# Patient Record
Sex: FEMALE | Race: BLACK OR AFRICAN AMERICAN | Employment: OTHER | URBAN - METROPOLITAN AREA
[De-identification: names, ages, dates, MRNs, and addresses within clinical notes are randomized per-mention and may not be internally consistent; named-entity substitution may affect disease eponyms.]

---

## 2017-01-23 DIAGNOSIS — I10 ESSENTIAL HYPERTENSION WITH GOAL BLOOD PRESSURE LESS THAN 140/90: ICD-10-CM

## 2017-01-29 RX ORDER — AMLODIPINE BESYLATE 5 MG/1
5 TABLET ORAL DAILY
Qty: 90 TAB | Refills: 0 | Status: SHIPPED | OUTPATIENT
Start: 2017-01-29 | End: 2017-05-30 | Stop reason: SDUPTHER

## 2017-05-26 DIAGNOSIS — N95.1 SYMPTOMS, SUCH AS FLUSHING, SLEEPLESSNESS, HEADACHE, LACK OF CONCENTRATION, ASSOCIATED WITH THE MENOPAUSE: ICD-10-CM

## 2017-05-26 DIAGNOSIS — I10 ESSENTIAL HYPERTENSION WITH GOAL BLOOD PRESSURE LESS THAN 140/90: ICD-10-CM

## 2017-05-30 RX ORDER — ATENOLOL 50 MG/1
TABLET ORAL
Qty: 90 TAB | Refills: 0 | Status: SHIPPED | OUTPATIENT
Start: 2017-05-30 | End: 2019-03-27 | Stop reason: SDUPTHER

## 2017-05-30 RX ORDER — AMLODIPINE BESYLATE 5 MG/1
5 TABLET ORAL DAILY
Qty: 90 TAB | Refills: 0 | Status: SHIPPED | OUTPATIENT
Start: 2017-05-30 | End: 2019-03-27 | Stop reason: SDUPTHER

## 2017-05-30 RX ORDER — CLONIDINE HYDROCHLORIDE 0.1 MG/1
0.1 TABLET ORAL
Qty: 90 TAB | Refills: 0 | Status: SHIPPED | OUTPATIENT
Start: 2017-05-30 | End: 2019-03-27 | Stop reason: SDUPTHER

## 2017-09-23 DIAGNOSIS — N95.1 SYMPTOMS, SUCH AS FLUSHING, SLEEPLESSNESS, HEADACHE, LACK OF CONCENTRATION, ASSOCIATED WITH THE MENOPAUSE: ICD-10-CM

## 2017-09-28 RX ORDER — CLONIDINE HYDROCHLORIDE 0.1 MG/1
TABLET ORAL
Qty: 90 TAB | Refills: 0 | OUTPATIENT
Start: 2017-09-28

## 2017-09-28 NOTE — TELEPHONE ENCOUNTER
Patient notified that her f/s had  over a year ago and she needed to renew before getting a provider appt. Assisted pt in making a f/s appt, discussed with her what needed to be brought in for that and on that day Oct 11, pt can be given a provider appointment for future visit to discuss refills needed.

## 2017-10-30 ENCOUNTER — HOSPITAL ENCOUNTER (OUTPATIENT)
Dept: MAMMOGRAPHY | Age: 57
Discharge: HOME | End: 2017-10-30
Attending: FAMILY MEDICINE
Payer: COMMERCIAL

## 2017-10-30 DIAGNOSIS — Z12.31 VISIT FOR SCREENING MAMMOGRAM: ICD-10-CM

## 2017-10-30 PROCEDURE — 77067 SCR MAMMO BI INCL CAD: CPT

## 2018-01-11 DIAGNOSIS — I10 ESSENTIAL HYPERTENSION WITH GOAL BLOOD PRESSURE LESS THAN 140/90: ICD-10-CM

## 2018-01-22 RX ORDER — ATENOLOL 50 MG/1
TABLET ORAL
Qty: 90 TAB | Refills: 0 | OUTPATIENT
Start: 2018-01-22

## 2018-01-29 ENCOUNTER — HOSPITAL ENCOUNTER (EMERGENCY)
Age: 58
Discharge: HOME | End: 2018-01-29
Attending: FAMILY MEDICINE

## 2018-01-29 ENCOUNTER — APPOINTMENT (OUTPATIENT)
Dept: GENERAL RADIOLOGY | Age: 58
End: 2018-01-29
Attending: FAMILY MEDICINE

## 2018-01-29 VITALS
TEMPERATURE: 97.8 F | BODY MASS INDEX: 34.36 KG/M2 | DIASTOLIC BLOOD PRESSURE: 79 MMHG | SYSTOLIC BLOOD PRESSURE: 176 MMHG | HEART RATE: 68 BPM | HEIGHT: 62 IN | OXYGEN SATURATION: 98 % | WEIGHT: 186.7 LBS | RESPIRATION RATE: 18 BRPM

## 2018-01-29 DIAGNOSIS — G57.01 PYRIFORMIS SYNDROME, RIGHT: Primary | ICD-10-CM

## 2018-01-29 LAB
BILIRUB UR QL: NEGATIVE
GLUCOSE UR QL STRIP.AUTO: NEGATIVE MG/DL
KETONES UR-MCNC: NEGATIVE MG/DL
LEUKOCYTE ESTERASE UR QL STRIP: NEGATIVE
NITRITE UR QL: NEGATIVE
PH UR: 6 [PH] (ref 5–8)
PROT UR QL: NEGATIVE MG/DL
RBC # UR STRIP: ABNORMAL /UL
SP GR UR: 1.02 (ref 1–1.03)
UROBILINOGEN UR QL: 0.2 EU/DL (ref 0.2–1)

## 2018-01-29 RX ORDER — METHOCARBAMOL 750 MG/1
750 TABLET, FILM COATED ORAL
Qty: 20 TAB | Refills: 0 | Status: SHIPPED | OUTPATIENT
Start: 2018-01-29 | End: 2019-01-19

## 2018-01-29 RX ORDER — PREDNISONE 20 MG/1
TABLET ORAL
Qty: 21 TAB | Refills: 0 | Status: SHIPPED | OUTPATIENT
Start: 2018-01-29 | End: 2019-01-19

## 2018-01-29 NOTE — DISCHARGE INSTRUCTIONS
Piriformis Syndrome: Care Instructions  Your Care Instructions    The piriformis muscle is deep under your rear end (buttock). One end of the muscle connects deep inside the pelvic area, and the other end attaches to the top of the thighbone. This muscle can press on the sciatic nerve that runs from your spine down your leg. When this happens, you may have pain, numbness, and tingling in the buttock and down the back of your leg. This is called piriformis syndrome. The pain may get worse when you sit for a long time or climb stairs. Also, you may be more likely to develop piriformis syndrome if you run or walk often. Your doctor will check for other causes of your pain before treating this syndrome. Treatment may include stretching exercises, massage, and medicine for the pain and swelling. If these do not help, you may get a shot of steroid medicine. Until the pain is gone, you may need to rest the muscle and limit activities like running. Exercises and a change in how you move and sit may be enough to stop the pressure on the nerve. Follow-up care is a key part of your treatment and safety. Be sure to make and go to all appointments, and call your doctor if you are having problems. It's also a good idea to know your test results and keep a list of the medicines you take. How can you care for yourself at home? · If your doctor thinks that strenuous exercise is causing your problem, stop or cut back on activities such as running. You may find swimming to be a good exercise for a while. · Stretch the piriformis muscle. Marlise Thompson on your back. ¨ Bend one leg at the knee and keep the other leg flat on the ground. ¨ Raise your bent knee up and then move it across your body. Hold the outside of the knee with the opposite hand. ¨ Gently pull the knee with your hand toward the opposite shoulder. ¨ Hold the stretch for at least 15 to 30 seconds. Switch legs. ¨ Do the stretch several times each day.   · Massage the muscle to relieve pressure. ¨ Sit on the floor. Lean to one side so that the hip on your sore side is off the ground. Put a tennis ball under your buttock on that side. ¨ As you put weight onto the tennis ball, you may find spots that are especially sore. Move gently so that the tennis ball gently massages each of the sore spots. · Use ice or heat to help reduce pain. Put ice or a cold pack or a heating pad set on low or a warm cloth on the sore area for 10 to 20 minutes at a time. Put a thin cloth between the ice pack or heating pad and your skin. · Ask your doctor if you can take an over-the-counter pain medicine, such as acetaminophen (Tylenol), ibuprofen (Advil, Motrin), or naproxen (Aleve). Be safe with medicines. Read and follow all instructions on the label. · Have your doctor or a physical therapist watch how you move. You may need physical therapy or special inserts in your shoes (orthotics) to help you move in a way that does not put pressure on your nerves. When should you call for help? Watch closely for changes in your health, and be sure to contact your doctor if:  ? · You do not feel better after several weeks of home care. ? · Your pain gets worse. ? · Your leg becomes weak or numb. Where can you learn more? Go to http://wilmar-nilesh.info/. Enter F252 in the search box to learn more about \"Piriformis Syndrome: Care Instructions. \"  Current as of: March 21, 2017  Content Version: 11.4  © 6996-3056 [employer]. Care instructions adapted under license by [employer] (which disclaims liability or warranty for this information). If you have questions about a medical condition or this instruction, always ask your healthcare professional. [place] [address] any warranty or liability for your use of this information.          Piriformis Syndrome: Exercises  Your Care Instructions  Here are some examples of typical rehabilitation exercises for your condition. Start each exercise slowly. Ease off the exercise if you start to have pain. Your doctor or physical therapist will tell you when you can start these exercises and which ones will work best for you. How to do the exercises  Hip rotator stretch    1. Lie on your back with both knees bent and your feet flat on the floor. 2. Put the ankle of your affected leg on your opposite thigh near your knee. 3. Use your hand to gently push your knee (on your affected leg) away from your body until you feel a gentle stretch around your hip. 4. Hold the stretch for 15 to 30 seconds. 5. Repeat 2 to 4 times. 6. Switch legs and repeat steps 1 through 5. Piriformis stretch    1. Lie on your back with your legs straight. 2. Lift your affected leg and bend your knee. With your opposite hand, reach across your body, and then gently pull your knee toward your opposite shoulder. 3. Hold the stretch for 15 to 30 seconds. 4. Repeat with your other leg. 5. Repeat 2 to 4 times on each side. Lower abdominal strengthening    1. Lie on your back with your knees bent and your feet flat on the floor. 2. Tighten your belly muscles by pulling your belly button in toward your spine. 3. Lift one foot off the floor and bring your knee toward your chest, so that your knee is straight above your hip and your leg is bent like the letter \"L. \"  4. Lift the other knee up to the same position. 5. Lower one leg at a time to the starting position. 6. Keep alternating legs until you have lifted each leg 8 to 12 times. 7. Be sure to keep your belly muscles tight and your back still as you are moving your legs. Be sure to breathe normally. Follow-up care is a key part of your treatment and safety. Be sure to make and go to all appointments, and call your doctor if you are having problems. It's also a good idea to know your test results and keep a list of the medicines you take. Where can you learn more?   Go to http://wilmar-nilesh.info/. Enter D398 in the search box to learn more about \"Piriformis Syndrome: Exercises. \"  Current as of: March 21, 2017  Content Version: 11.4  © 7218-8204 [employer], [employer]. Care instructions adapted under license by [employer] (which disclaims liability or warranty for this information). If you have questions about a medical condition or this instruction, always ask your healthcare professional. [place] [address] any warranty or liability for your use of this information.

## 2018-01-29 NOTE — UC PROVIDER NOTE
Patient is a 65 y.o. female presenting with buttocks pain. The history is provided by the patient. Buttocks pain    This is a new problem. The current episode started more than 1 week ago (3 weeks). The problem occurs constantly. The problem has not changed since onset. The pain is present in the right hip and right upper leg (rt buttock. ). The quality of the pain is described as aching and constant. The pain is at a severity of 7/10. The pain is moderate. Associated symptoms include limited range of motion. Pertinent negatives include no stiffness. The symptoms are aggravated by standing, activity and movement. She has tried arthritis medications for the symptoms. The treatment provided no relief. There has been no history of extremity trauma. Past Medical History:   Diagnosis Date    Asthma     Diverticulosis     on colonoscopy 2012    Essential hypertension         Past Surgical History:   Procedure Laterality Date    HX APPENDECTOMY      HX HYSTERECTOMY  1997    secondary to endometriosis    UT COLONOSCOPY FLX DX W/COLLJ SPEC WHEN PFRMD  12/12/2012              Family History   Problem Relation Age of Onset    Diabetes Mother     Cancer Mother 66     breast cancer    Breast Cancer Mother 68    Cancer Maternal Aunt      2 with breast cancer    Stroke Maternal Grandmother         Social History     Social History    Marital status:      Spouse name: N/A    Number of children: N/A    Years of education: N/A     Occupational History    Not on file. Social History Main Topics    Smoking status: Former Smoker     Types: Cigarettes     Quit date: 7/3/1997    Smokeless tobacco: Never Used      Comment: Quit in 1990s.  Alcohol use No    Drug use: No    Sexual activity: Not on file     Other Topics Concern    Not on file     Social History Narrative                ALLERGIES: Other medication    Review of Systems   Musculoskeletal: Negative for stiffness.    All other systems reviewed and are negative. Vitals:    01/29/18 1512   BP: 176/79   Pulse: 68   Resp: 18   Temp: 97.8 °F (36.6 °C)   SpO2: 98%   Weight: 84.7 kg (186 lb 11.2 oz)   Height: 5' 2\" (1.575 m)       Physical Exam   Constitutional: No distress. Musculoskeletal: She exhibits no edema. Right hip: She exhibits normal range of motion, normal strength, no tenderness, no bony tenderness and no crepitus. Cervical back: Normal.        Thoracic back: Normal.        Lumbar back: Normal.        Legs:  Nursing note and vitals reviewed. MDM     Differential Diagnosis; Clinical Impression; Plan:     CLINICAL IMPRESSION:  Pyriformis syndrome, right  (primary encounter diagnosis)      DDX    Plan:    Mild OA of bilateral hip  Self exercise- and avoid strenuous activity     Consider to see chiropractor if not better in 3 days    Tapering prednisone and robaxin     Follow with PCP   Amount and/or Complexity of Data Reviewed:   Tests in the radiology section of CPT®:  Ordered and reviewed   Review and summarize past medical records:  Yes  Risk of Significant Complications, Morbidity, and/or Mortality:   Presenting problems: Moderate  Diagnostic procedures: Moderate  Management options:   Moderate  Progress:   Patient progress:  Stable      Procedures

## 2018-10-31 ENCOUNTER — HOSPITAL ENCOUNTER (OUTPATIENT)
Dept: MAMMOGRAPHY | Age: 58
Discharge: HOME | End: 2018-10-31
Attending: FAMILY MEDICINE
Payer: COMMERCIAL

## 2018-10-31 DIAGNOSIS — Z12.39 BREAST SCREENING: ICD-10-CM

## 2018-10-31 PROCEDURE — 77067 SCR MAMMO BI INCL CAD: CPT

## 2019-01-19 ENCOUNTER — OFFICE VISIT (OUTPATIENT)
Dept: URGENT CARE | Age: 59
End: 2019-01-19

## 2019-01-19 VITALS
HEIGHT: 62 IN | SYSTOLIC BLOOD PRESSURE: 174 MMHG | HEART RATE: 68 BPM | TEMPERATURE: 98.7 F | BODY MASS INDEX: 36.44 KG/M2 | OXYGEN SATURATION: 97 % | RESPIRATION RATE: 18 BRPM | WEIGHT: 198 LBS | DIASTOLIC BLOOD PRESSURE: 90 MMHG
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
## 2019-01-19 DIAGNOSIS — M25.551 RIGHT HIP PAIN: Primary | ICD-10-CM

## 2019-01-19 RX ORDER — PREDNISONE 5 MG/1
TABLET ORAL
Qty: 21 TAB | Refills: 0 | Status: SHIPPED | OUTPATIENT
Start: 2019-01-19 | End: 2019-03-27

## 2019-01-19 NOTE — PROGRESS NOTES
Slipped on ice Monday (5 days ago) while walking  Hit right hip on the ground. Did not hit head and denies any other extremity or body part injury. She has continued to have 8 out of 10 right hip pain sharp lateral to posterior in location, non radiating Intermittent throughout the day. Worse with walking. Improved with rest.  Has tried OTC motrin without improvement. Overall not getting better  Specifically denies: clunking, popping, or leg giving out. Hx of piriformis syndrome right side             Past Medical History:   Diagnosis Date    Asthma     Diverticulosis     on colonoscopy     Essential hypertension         Past Surgical History:   Procedure Laterality Date    HX APPENDECTOMY      HX HYSTERECTOMY      secondary to endometriosis    [facility] COLONOSCOPY FLX DX W/COLLJ SPEC WHEN PFRMD  2012              Family History   Problem Relation Age of Onset    Diabetes Mother     Cancer Mother 66        breast cancer    Breast Cancer Mother 68    Cancer Maternal Aunt         2 with breast cancer    Breast Cancer Maternal Aunt     Stroke Maternal Grandmother     Breast Cancer Maternal Aunt         Social History     Socioeconomic History    Marital status:      Spouse name: Not on file    Number of children: Not on file    Years of education: Not on file    Highest education level: Not on file   Social Needs    Financial resource strain: Not on file    Food insecurity - worry: Not on file    Food insecurity - inability: Not on file   Propel needs - medical: Not on file   Propel needs - non-medical: Not on file   Occupational History    Not on file   Tobacco Use    Smoking status: Former Smoker     Types: Cigarettes     Last attempt to quit: 7/3/1997     Years since quittin.5    Smokeless tobacco: Never Used    Tobacco comment: Quit in 36s.    Substance and Sexual Activity    Alcohol use: No     Alcohol/week: 0.0 oz    Drug use: No    Sexual activity: Not on file   Other Topics Concern    Not on file   Social History Narrative    Not on file                ALLERGIES: Other medication    Review of Systems   Neurological: Negative for dizziness, weakness and numbness. All other systems reviewed and are negative. Vitals:    01/19/19 1040   BP: 174/90   Pulse: 68   Resp: 18   Temp: 98.7 °F (37.1 °C)   SpO2: 97%   Weight: 198 lb (89.8 kg)   Height: 5' 2\" (1.575 m)       Physical Exam   Constitutional: She is oriented to person, place, and time. No distress. HENT:   Head: Normocephalic and atraumatic. Eyes: EOM are normal. Pupils are equal, round, and reactive to light. Neck: Normal range of motion. Neck supple. Cardiovascular: Normal rate, regular rhythm and normal heart sounds. Pulmonary/Chest: Effort normal and breath sounds normal. No respiratory distress. She has no wheezes. She has no rales. Musculoskeletal:        Right hip: She exhibits tenderness. She exhibits normal range of motion, normal strength, no swelling, no crepitus and no deformity. Legs:  Knee flexion and extension 5/5 strength. No hamstring pain. Pain elicited with right hip flexion and external rotation. Neurological: She is alert and oriented to person, place, and time. Skin: No rash noted. She is not diaphoretic. No pallor. Psychiatric: She has a normal mood and affect. Her behavior is normal. Thought content normal.       MDM     Differential Diagnosis; Clinical Impression; Plan:       CLINICAL IMPRESSION:  (M25.551) Right hip pain  (primary encounter diagnosis)  (E32.96MA) Fall from ground level    Orders Placed This Encounter      predniSONE (STERAPRED) 5 mg dose pack          Sig: See administration instruction per 5mg dose pack          Dispense:  21 Tab          Refill:  0      Plan:  1. No acute abnormality on x ray  2. Prednisone for pain, avoid NSAIDs take with food. 3. Cool or warm compresses  4.  Follow up with Sports Med OR Orthopedics without improvement over next 7 to 10 days. We have reviewed concerning signs/symptoms, normal vs abnormal progression of medical condition and when to seek immediate medical attention. XR Results (most recent):  Results from Appointment encounter on 01/19/19   XR HIP RT W OR WO PELV 2-3 VWS    Narrative EXAM: XR HIP RT W OR WO PELV 2-3 VWS    INDICATION: fall, hit R hip on ground, right hip pain. FINDINGS: An AP view of the pelvis and a frogleg lateral view of the right hip  demonstrate no fracture, dislocation or other acute abnormality. Impression IMPRESSION: No acute abnormality.          Procedures

## 2019-01-19 NOTE — PATIENT INSTRUCTIONS
Follow up with ortho OR PCP without improvement over next 7-10 days sooner/immediately for new or worsening       Hip Pain: Care Instructions  Your Care Instructions    Hip pain may be caused by many things, including overuse, a fall, or a twisting movement. Another cause of hip pain is arthritis. Your pain may increase when you stand up, walk, or squat. The pain may come and go or may be constant. Home treatment can help relieve hip pain, swelling, and stiffness. If your pain is ongoing, you may need more tests and treatment. Follow-up care is a key part of your treatment and safety. Be sure to make and go to all appointments, and call your doctor if you are having problems. It's also a good idea to know your test results and keep a list of the medicines you take. How can you care for yourself at home? · Take pain medicines exactly as directed. ? If the doctor gave you a prescription medicine for pain, take it as prescribed. ? If you are not taking a prescription pain medicine, ask your doctor if you can take an over-the-counter medicine. · Rest and protect your hip. Take a break from any activity, including standing or walking, that may cause pain. · Put ice or a cold pack against your hip for 10 to 20 minutes at a time. Try to do this every 1 to 2 hours for the next 3 days (when you are awake) or until the swelling goes down. Put a thin cloth between the ice and your skin. · Sleep on your healthy side with a pillow between your knees, or sleep on your back with pillows under your knees. · If there is no swelling, you can put moist heat, a heating pad, or a warm cloth on your hip. Do gentle stretching exercises to help keep your hip flexible. · Learn how to prevent falls. Have your vision and hearing checked regularly. Wear slippers or shoes with a nonskid sole. · Stay at a healthy weight. · Wear comfortable shoes. When should you call for help?   Call 911 anytime you think you may need emergency care. For example, call if:    · You have sudden chest pain and shortness of breath, or you cough up blood.     · You are not able to stand or walk or bear weight.     · Your buttocks, legs, or feet feel numb or tingly.     · Your leg or foot is cool or pale or changes color.     · You have severe pain.    Call your doctor now or seek immediate medical care if:    · You have signs of infection, such as:  ? Increased pain, swelling, warmth, or redness in the hip area. ? Red streaks leading from the hip area. ? Pus draining from the hip area. ? A fever.     · You have signs of a blood clot, such as:  ? Pain in your calf, back of the knee, thigh, or groin. ? Redness and swelling in your leg or groin.     · You are not able to bend, straighten, or move your leg normally.     · You have trouble urinating or having bowel movements.    Watch closely for changes in your health, and be sure to contact your doctor if:    · You do not get better as expected. Where can you learn more? Go to http://wilmar-nilesh.info/. Enter J396 in the search box to learn more about \"Hip Pain: Care Instructions. \"  Current as of: September 23, 2018  Content Version: 11.9  © 8260-6097 [employer]. Care instructions adapted under license by [employer] (which disclaims liability or warranty for this information). If you have questions about a medical condition or this instruction, always ask your healthcare professional. [place] [address] any warranty or liability for your use of this information.

## 2019-03-27 ENCOUNTER — OFFICE VISIT (OUTPATIENT)
Dept: FAMILY MEDICINE CLINIC | Age: 59
End: 2019-03-27

## 2019-03-27 ENCOUNTER — HOSPITAL ENCOUNTER (OUTPATIENT)
Dept: LAB | Age: 59
Discharge: HOME | End: 2019-03-27

## 2019-03-27 VITALS
SYSTOLIC BLOOD PRESSURE: 143 MMHG | BODY MASS INDEX: 35.48 KG/M2 | DIASTOLIC BLOOD PRESSURE: 79 MMHG | HEART RATE: 65 BPM | WEIGHT: 194 LBS | TEMPERATURE: 98.3 F

## 2019-03-27 DIAGNOSIS — J45.40 MODERATE PERSISTENT ASTHMA WITHOUT COMPLICATION: ICD-10-CM

## 2019-03-27 DIAGNOSIS — N95.1 SYMPTOMS, SUCH AS FLUSHING, SLEEPLESSNESS, HEADACHE, LACK OF CONCENTRATION, ASSOCIATED WITH THE MENOPAUSE: ICD-10-CM

## 2019-03-27 DIAGNOSIS — I10 ESSENTIAL HYPERTENSION WITH GOAL BLOOD PRESSURE LESS THAN 140/90: ICD-10-CM

## 2019-03-27 DIAGNOSIS — I10 ESSENTIAL HYPERTENSION WITH GOAL BLOOD PRESSURE LESS THAN 140/90: Primary | ICD-10-CM

## 2019-03-27 PROCEDURE — 80061 LIPID PANEL: CPT

## 2019-03-27 PROCEDURE — 80053 COMPREHEN METABOLIC PANEL: CPT

## 2019-03-27 RX ORDER — ATENOLOL 50 MG/1
TABLET ORAL
Qty: 90 TAB | Refills: 1 | Status: SHIPPED | OUTPATIENT
Start: 2019-03-27 | End: 2019-08-14 | Stop reason: SDUPTHER

## 2019-03-27 RX ORDER — CLONIDINE HYDROCHLORIDE 0.1 MG/1
0.1 TABLET ORAL
Qty: 90 TAB | Refills: 1 | Status: SHIPPED | OUTPATIENT
Start: 2019-03-27 | End: 2019-08-14 | Stop reason: SDUPTHER

## 2019-03-27 RX ORDER — AMLODIPINE BESYLATE 5 MG/1
5 TABLET ORAL DAILY
Qty: 90 TAB | Refills: 1 | Status: SHIPPED | OUTPATIENT
Start: 2019-03-27 | End: 2019-08-14 | Stop reason: SDUPTHER

## 2019-03-27 RX ORDER — ALBUTEROL SULFATE 90 UG/1
1 AEROSOL, METERED RESPIRATORY (INHALATION)
Qty: 1 INHALER | Refills: 2 | Status: SHIPPED | OUTPATIENT
Start: 2019-03-27 | End: 2019-06-26 | Stop reason: SDUPTHER

## 2019-03-27 NOTE — PROGRESS NOTES
Subjective: Chief Complaint Patient presents with  Medication Refill  Diarrhea  
  x 5 days  
 
she is a 63y.o. year old female who presents for evalution. Refill bp meds, [facility] has been her p[facility], last saw them 1 year ago, has run out of amlodipine about a month ago  but is taking the others. Refill albuterol, h/o mild persistant asthma, former smoker. N/v and diarrhea past 4 days, no n/v for several days, loose stool x 1 yesterday and early this am.  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 Stomach feels pretty good at this time, has been using immodium occas. . Eating a bland mostly liquid diet. Past Medical History:  
Diagnosis Date  Asthma  Diverticulosis   
 on colonoscopy 2012  Essential hypertension Objective:  
 
Vitals:  
 03/27/19 3252 BP: 143/79 Pulse: 65 Temp: 98.3 °F (36.8 °C) TempSrc: Oral  
Weight: 194 lb (88 kg) Physical Examination: General appearance - alert, well appearing, and in no distress Neck - supple, no significant adenopathy, carotids upstroke normal bilaterally, no bruits, thyroid exam: thyroid is normal in size without nodules or tenderness Chest - clear to auscultation, no wheezes, rales or rhonchi, symmetric air entry Heart - normal rate, regular rhythm, normal S1, S2, no murmurs, rubs, clicks or gallops Abdomen - soft, nontender, nondistended, no masses or organomegaly Assessment/ Plan: 1.  htn-- restart amlodipine and continue other htn meds. 2.  GE, resolving. Start solids, avoid fatty foods and lactose until sx are completely resolved. Ok to use immodium occas if needs to get to work or be away from the house. 3.  Mild intermittent asthma, refilled albuterol. Orders Placed This Encounter  cloNIDine HCl (CATAPRES) 0.1 mg tablet Sig: Take 1 Tab by mouth nightly. Need to make office appointment for BP check. Dispense:  90 Tab Refill:  1 This patient needs to make an office appointment before more refills will be given.  atenolol (TENORMIN) 50 mg tablet Sig: TAKE ONE TABLET BY MOUTH ONE TIME DAILY Dispense:  90 Tab Refill:  1 This patient needs to make an office appointment before more refills will be given.  amLODIPine (NORVASC) 5 mg tablet Sig: Take 1 Tab by mouth daily. Dispense:  90 Tab Refill:  1 This patient needs to make an office appointment before more refills will be given.  albuterol (PROVENTIL HFA, VENTOLIN HFA, PROAIR HFA) 90 mcg/actuation inhaler Sig: Take 1 Puff by inhalation every six (6) hours as needed for Wheezing. Dispense:  1 Inhaler Refill:  2

## 2019-03-28 LAB
ALBUMIN SERPL-MCNC: 3.7 G/DL (ref 3.5–5)
ALBUMIN/GLOB SERPL: 1 {RATIO} (ref 1.1–2.2)
ALP SERPL-CCNC: 73 U/L (ref 45–117)
ALT SERPL-CCNC: 34 U/L (ref 12–78)
ANION GAP SERPL CALC-SCNC: 6 MMOL/L (ref 5–15)
AST SERPL-CCNC: 19 U/L (ref 15–37)
BILIRUB SERPL-MCNC: 0.3 MG/DL (ref 0.2–1)
BUN SERPL-MCNC: 11 MG/DL (ref 6–20)
BUN/CREAT SERPL: 20 (ref 12–20)
CALCIUM SERPL-MCNC: 8.9 MG/DL (ref 8.5–10.1)
CHLORIDE SERPL-SCNC: 107 MMOL/L (ref 97–108)
CHOLEST SERPL-MCNC: 115 MG/DL
CO2 SERPL-SCNC: 28 MMOL/L (ref 21–32)
CREAT SERPL-MCNC: 0.55 MG/DL (ref 0.55–1.02)
GLOBULIN SER CALC-MCNC: 3.8 G/DL (ref 2–4)
GLUCOSE SERPL-MCNC: 82 MG/DL (ref 65–100)
HDLC SERPL-MCNC: 37 MG/DL
HDLC SERPL: 3.1 {RATIO} (ref 0–5)
LDLC SERPL CALC-MCNC: 62 MG/DL (ref 0–100)
LIPID PROFILE,FLP: NORMAL
POTASSIUM SERPL-SCNC: 4.1 MMOL/L (ref 3.5–5.1)
PROT SERPL-MCNC: 7.5 G/DL (ref 6.4–8.2)
SODIUM SERPL-SCNC: 141 MMOL/L (ref 136–145)
TRIGL SERPL-MCNC: 80 MG/DL (ref ?–150)
VLDLC SERPL CALC-MCNC: 16 MG/DL

## 2019-04-19 ENCOUNTER — OFFICE VISIT (OUTPATIENT)
Dept: URGENT CARE | Age: 59
End: 2019-04-19

## 2019-04-19 VITALS
SYSTOLIC BLOOD PRESSURE: 138 MMHG | DIASTOLIC BLOOD PRESSURE: 79 MMHG | TEMPERATURE: 98.3 F | RESPIRATION RATE: 16 BRPM | WEIGHT: 194 LBS | BODY MASS INDEX: 35.7 KG/M2 | HEIGHT: 62 IN | OXYGEN SATURATION: 97 % | HEART RATE: 62 BPM

## 2019-04-19 DIAGNOSIS — F43.9 STRESS: ICD-10-CM

## 2019-04-19 DIAGNOSIS — R07.9 CHEST PAIN, UNSPECIFIED TYPE: Primary | ICD-10-CM

## 2019-04-19 NOTE — PROGRESS NOTES
Chest Pain The history is provided by the patient. This is a new problem. The current episode started yesterday. The problem has been resolved. 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 The pain is present in the right side. The pain is mild. Quality: \"pulling\" like a muscle. The pain does not radiate. The symptoms are aggravated by stress. Pertinent negatives include no abdominal pain, no back pain, no claudication, no cough, no diaphoresis, no dizziness, no exertional chest pressure, no fever, no headaches, no hemoptysis, no irregular heartbeat, no leg pain, no lower extremity edema, no malaise/fatigue, no nausea, no near-syncope, no numbness, no orthopnea, no palpitations, no PND, no shortness of breath, no sputum production, no vomiting and no weakness. She has tried nothing for the symptoms. Her past medical history is significant for HTN. Past Medical History:  
Diagnosis Date  Asthma  Diverticulosis   
 on colonoscopy 2012  Essential hypertension Past Surgical History:  
Procedure Laterality Date  HX APPENDECTOMY  HX HYSTERECTOMY  1997  
 secondary to endometriosis  UT COLONOSCOPY FLX DX W/COLLJ SPEC WHEN PFRMD  12/12/2012 Family History Problem Relation Age of Onset  Diabetes Mother  Cancer Mother 66  
     breast cancer  Breast Cancer Mother 68  Cancer Maternal Aunt   
     2 with breast cancer  Breast Cancer Maternal Aunt  Stroke Maternal Grandmother  Breast Cancer Maternal Aunt Social History Socioeconomic History  Marital status:  Spouse name: Not on file  Number of children: Not on file  Years of education: Not on file  Highest education level: Not on file Occupational History  Not on file Social Needs  Financial resource strain: Not on file  Food insecurity:  
  Worry: Not on file Inability: Not on file  Transportation needs: Medical: Not on file Non-medical: Not on file Tobacco Use  Smoking status: Former Smoker Types: Cigarettes Last attempt to quit: 7/3/1997 Years since quittin.8  Smokeless tobacco: Never Used  Tobacco comment: Quit in 37s. Substance and Sexual Activity  Alcohol use: No  
  Alcohol/week: 0.0 oz  Drug use: No  
 Sexual activity: Not on file Lifestyle  Physical activity:  
  Days per week: Not on file Minutes per session: Not on file  Stress: Not on file Relationships  Social connections:  
  Talks on phone: Not on file Gets together: Not on file Attends Mandaen service: Not on file Active member of club or organization: Not on file Attends meetings of clubs or organizations: Not on file Relationship status: Not on file  Intimate partner violence:  
  Fear of current or ex partner: Not on file Emotionally abused: Not on file Physically abused: Not on file Forced sexual activity: Not on file Other Topics Concern  Not on file Social History Narrative  Not on file ALLERGIES: Other medication Review of Systems Constitutional: Negative for chills, diaphoresis, fever and malaise/fatigue. Respiratory: Negative for cough, hemoptysis, sputum production, shortness of breath and wheezing. Cardiovascular: Positive for chest pain. Negative for palpitations, orthopnea, claudication, PND and near-syncope. Gastrointestinal: Negative for abdominal pain, nausea and vomiting. Musculoskeletal: Negative for back pain and myalgias. Skin: Negative for rash. Neurological: Negative for dizziness, weakness, numbness and headaches. Hematological: Negative for adenopathy. Vitals:  
 19 1306 BP: 138/79 Pulse: 62 Resp: 16 Temp: 98.3 °F (36.8 °C) SpO2: 97% Weight: 194 lb (88 kg) Height: 5' 2\" (1.575 m) Physical Exam  
 Constitutional: She appears well-developed and well-nourished. No distress. Cardiovascular: Normal rate, regular rhythm and normal heart sounds. Pulmonary/Chest: Effort normal and breath sounds normal. No respiratory distress. She has no wheezes. She has no rales. She exhibits no tenderness. Neurological: She is alert. She has normal strength. No sensory deficit. Skin: She is not diaphoretic. Psychiatric: She has a normal mood and affect. Her behavior is normal. Judgment and thought content normal.  
Nursing note and vitals reviewed. MDM 
  ICD-10-CM ICD-9-CM 1. Chest pain, unspecified type R07.9 786.50 2. Stress F43.9 V62.89 EKG, 12 LEAD, INITIAL Meditation/relaxation techniques The patient is to follow up with PCP. If signs and symptoms become worse the pt is to go to the ER. EKG Date/Time: 4/19/2019 1:45 PM 
Performed by: Joey Hathaway MD 
Authorized by: Joey Hathaway MD  
Rhythm: sinus rhythm Rate: normal 
BPM: 60 
QRS axis: normal 
Conduction: right bundle branch block ST Segments: ST segments normal 
T Waves: T waves normal

## 2019-04-19 NOTE — PATIENT INSTRUCTIONS
Meditation/relaxation ER if returns/worse Chest Pain: Care Instructions Your Care Instructions There are many things that can cause chest pain. Some are not serious and will get better on their own in a few days. But some kinds of chest pain need more testing and treatment. Your doctor may have recommended a follow-up visit in the next 8 to 12 hours. If you are not getting better, you may need more tests or treatment. Even though your doctor has released you, you still need to watch for any problems. The doctor carefully checked you, but sometimes problems can develop later. If you have new symptoms or if your symptoms do not get better, get medical care right away. If you have worse or different chest pain or pressure that lasts more than 5 minutes or you passed out (lost consciousness), call 911 or seek other emergency help right away. A medical visit is only one step in your treatment. Even if you feel better, you still need to do what your doctor recommends, such as going to all suggested follow-up appointments and taking medicines exactly as directed. This will help you recover and help prevent future problems. How can you care for yourself at home? · Rest until you feel better. · Take your medicine exactly as prescribed. Call your doctor if you think you are having a problem with your medicine. · Do not drive after taking a prescription pain medicine. When should you call for help? Call 911 if: 
  · You passed out (lost consciousness).  
  · You have severe difficulty breathing.  
  · You have symptoms of a heart attack. These may include: 
? Chest pain or pressure, or a strange feeling in your chest. 
? Sweating. ? Shortness of breath. ? Nausea or vomiting. ? Pain, pressure, or a strange feeling in your back, neck, jaw, or upper belly or in one or both shoulders or arms. ? Lightheadedness or sudden weakness. ? A fast or irregular heartbeat. After you call 911, the  may tell you to chew 1 adult-strength or 2 to 4 low-dose aspirin. Wait for an ambulance. Do not try to drive yourself.  
 Call your doctor today if: 
  · You have any trouble breathing.  
  · Your chest pain gets worse.  
  · You are dizzy or lightheaded, or you feel like you may faint.  
  · You are not getting better as expected.  
  · You are having new or different chest pain. Where can you learn more? Go to http://wilmar-nilesh.info/. Enter A120 in the search box to learn more about \"Chest Pain: Care Instructions. \" Current as of: September 23, 2018 Content Version: 11.9 © 9903-2746 [employer]. Care instructions adapted under license by [employer] (which disclaims liability or warranty for this information). If you have questions about a medical condition or this instruction, always ask your healthcare professional. Dayami Potter any warranty or liability for your use of this information. Learning About Stress What is stress? Stress is what you feel when you have to handle more than you are used to. Stress is a fact of life for most people, and it affects everyone differently. What causes stress for you may not be stressful for someone else. A lot of things can cause stress. You may feel stress when you go on a job interview, take a test, or run a race. This kind of short-term stress is normal and even useful. It can help you if you need to work hard or react quickly. For example, stress can help you finish an important job on time. Stress also can last a long time. Long-term stress is caused by stressful situations or events. Examples of long-term stress include long-term health problems, ongoing problems at work, or conflicts in your family. Long-term stress can harm your health. How does stress affect your health? When you are stressed, your body responds as though you are in danger.  It makes hormones that speed up your heart, make you breathe faster, and give you a burst of energy. This is called the fight-or-flight stress response. If the stress is over quickly, your body goes back to normal and no harm is done. But if stress happens too often or lasts too long, it can have bad effects. Long-term stress can make you more likely to get sick, and it can make symptoms of some diseases worse. If you tense up when you are stressed, you may develop neck, shoulder, or low back pain. Stress is linked to high blood pressure and heart disease. Stress also harms your emotional health. It can make you cintron, tense, or depressed. Your relationships may suffer, and you may not do well at work or school. What can you do to manage stress? How to relax your mind · Write. It may help to write about things that are bothering you. This helps you find out how much stress you feel and what is causing it. When you know this, you can find better ways to cope. · Let your feelings out. Talk, laugh, cry, and express anger when you need to. Talking with friends, family, a counselor, or a member of the clergy about your feelings is a healthy way to relieve stress. · Do something you enjoy. For example, listen to music or go to a movie. Practice your hobby or do volunteer work. · Meditate. This can help you relax, because you are not worrying about what happened before or what may happen in the future. · Do guided imagery. Imagine yourself in any setting that helps you feel calm. You can use audiotapes, books, or a teacher to guide you. How to relax your body · Do something active. Exercise or activity can help reduce stress. Walking is a great way to get started. Even everyday activities such as housecleaning or yard work can help. · Do breathing exercises. For example: ? From a standing position, bend forward from the waist with your knees slightly bent. Let your arms dangle close to the floor. ? Breathe in slowly and deeply as you return to a standing position. Roll up slowly and lift your head last. 
? Hold your breath for just a few seconds in the standing position. ? Breathe out slowly and bend forward from the waist. 
· Try yoga or pravin chi. These techniques combine exercise and meditation. You may need some training at first to learn them. What can you do to prevent stress? · Manage your time. This helps you find time to do the things you want and need to do. · Get enough sleep. Your body recovers from the stresses of the day while you are sleeping. · Get support. Your family, friends, and community can make a difference in how you experience stress. Where can you learn more? Go to http://wilmar-nilesh.info/. Enter F950 in the search box to learn more about \"Learning About Stress. \" Current as of: June 28, 2018 Content Version: 11.9 © 6510-9910 [employer]. Care instructions adapted under license by [employer] (which disclaims liability or warranty for this information). If you have questions about a medical condition or this instruction, always ask your healthcare professional. [address] any warranty or liability for your use of this information.

## 2019-06-26 ENCOUNTER — OFFICE VISIT (OUTPATIENT)
Dept: FAMILY MEDICINE CLINIC | Age: 59
End: 2019-06-26

## 2019-06-26 VITALS
BODY MASS INDEX: 35.12 KG/M2 | WEIGHT: 192 LBS | SYSTOLIC BLOOD PRESSURE: 131 MMHG | DIASTOLIC BLOOD PRESSURE: 73 MMHG | TEMPERATURE: 98.5 F | OXYGEN SATURATION: 96 % | HEART RATE: 54 BPM

## 2019-06-26 DIAGNOSIS — J45.40 MODERATE PERSISTENT ASTHMA WITHOUT COMPLICATION: ICD-10-CM

## 2019-06-26 DIAGNOSIS — E66.01 SEVERE OBESITY (HCC): ICD-10-CM

## 2019-06-26 RX ORDER — ALBUTEROL SULFATE 90 UG/1
1 AEROSOL, METERED RESPIRATORY (INHALATION)
Qty: 1 INHALER | Refills: 2 | Status: SHIPPED | OUTPATIENT
Start: 2019-06-26 | End: 2019-12-13 | Stop reason: SDUPTHER

## 2019-06-26 RX ORDER — ALBUTEROL SULFATE 90 UG/1
1 AEROSOL, METERED RESPIRATORY (INHALATION)
Qty: 1 INHALER | Refills: 5
Start: 2019-06-26 | End: 2019-12-13 | Stop reason: SDUPTHER

## 2019-06-26 RX ORDER — CETIRIZINE HCL 10 MG
10 TABLET ORAL
Qty: 30 TAB | Refills: 11 | Status: SHIPPED | OUTPATIENT
Start: 2019-06-26 | End: 2020-10-08 | Stop reason: SDUPTHER

## 2019-06-26 RX ORDER — FLUTICASONE PROPIONATE AND SALMETEROL 250; 50 UG/1; UG/1
1 POWDER RESPIRATORY (INHALATION) EVERY 12 HOURS
Qty: 1 INHALER | Refills: 5
Start: 2019-06-26 | End: 2019-09-11

## 2019-06-26 NOTE — PROGRESS NOTES
Coordination of Care  1. Have you been to the ER, urgent care clinic since your last visit? Hospitalized since your last visit? No    2. Have you seen or consulted any other health care providers outside of the [address] [place] [address] since your last visit? Include any pap smears or colon screening. No    Does the patient need refills? YES    Learning Assessment Complete?  yes

## 2019-06-26 NOTE — PROGRESS NOTES
Subjective:     Chief Complaint   Patient presents with    Hypertension     f/u     she is a 63y.o. year old female who presents for evalution. Doing well with bp. Reports allergies are bothering her, especially wheezing. Using albuterol 3-4 times daily, taking otc allergy med that she doesn't know the name of or how often it is to be taken, takes ~every day. Nasal/ear sx, no discolored mucus. Nonsmoker. Having increased sx when she or neighbors cut the grass. Has taken advair in the past which really hellped sx. Past Medical History:   Diagnosis Date    Asthma     Diverticulosis     on colonoscopy 2012    Essential hypertension              Objective:     Vitals:    06/26/19 1100   BP: 131/73   Pulse: (!) 54   Temp: 98.5 °F (36.9 °C)   TempSrc: Oral   SpO2: 96%   Weight: 192 lb (87.1 kg)       Physical Examination: General appearance - alert, well appearing, and in no distress  Neck - supple, no significant adenopathy, thyroid exam: thyroid is normal in size without nodules or tenderness  Chest - clear to auscultation, no wheezes, rales or rhonchi, symmetric air entry  Heart - normal rate, regular rhythm, normal S1, S2, no murmurs, rubs, clicks or gallops      Assessment/ Plan:   1.  htn-- well-controlled. 2.  Allergies/asthma-- change allergy med to zyrtec and will get advair through [facility]. If she doesn't qualify, needs to call and I will rx singulair. Continue albuterol 1 puff q4 hours prn. Mask for grass-cutting days. Orders Placed This Encounter    albuterol (PROVENTIL HFA, VENTOLIN HFA, PROAIR HFA) 90 mcg/actuation inhaler     Sig: Take 1 Puff by inhalation every six (6) hours as needed for Wheezing. Dispense:  1 Inhaler     Refill:  2    fluticasone propion-salmeterol (ADVAIR/WIXELA) 250-50 mcg/dose diskus inhaler     Sig: Take 1 Puff by inhalation every twelve (12) hours.      Dispense:  1 Inhaler     Refill:  5    albuterol (VENTOLIN HFA) 90 mcg/actuation inhaler     Sig: Take 1 Puff by inhalation every four (4) hours as needed for Wheezing. Dispense:  1 Inhaler     Refill:  5    cetirizine (ZYRTEC) 10 mg tablet     Sig: Take 1 Tab by mouth nightly. Dispense:  30 Tab     Refill:  11           Follow-up and Dispositions    · Return for outreach asap, me 2 months.

## 2019-07-10 ENCOUNTER — OFFICE VISIT (OUTPATIENT)
Dept: FAMILY MEDICINE CLINIC | Age: 59
End: 2019-07-10

## 2019-07-10 DIAGNOSIS — Z71.89 COUNSELING AND COORDINATION OF CARE: Primary | ICD-10-CM

## 2019-08-14 DIAGNOSIS — I10 ESSENTIAL HYPERTENSION WITH GOAL BLOOD PRESSURE LESS THAN 140/90: ICD-10-CM

## 2019-08-14 DIAGNOSIS — N95.1 SYMPTOMS, SUCH AS FLUSHING, SLEEPLESSNESS, HEADACHE, LACK OF CONCENTRATION, ASSOCIATED WITH THE MENOPAUSE: ICD-10-CM

## 2019-08-14 RX ORDER — AMLODIPINE BESYLATE 5 MG/1
TABLET ORAL
Qty: 90 TAB | Refills: 1 | Status: SHIPPED | OUTPATIENT
Start: 2019-08-14 | End: 2019-08-14 | Stop reason: SDUPTHER

## 2019-08-14 RX ORDER — ATENOLOL 50 MG/1
TABLET ORAL
Qty: 90 TAB | Refills: 1 | Status: SHIPPED | OUTPATIENT
Start: 2019-08-14 | End: 2019-08-14 | Stop reason: SDUPTHER

## 2019-08-14 RX ORDER — CLONIDINE HYDROCHLORIDE 0.1 MG/1
TABLET ORAL
Qty: 90 TAB | Refills: 1 | Status: SHIPPED | OUTPATIENT
Start: 2019-08-14 | End: 2019-12-13 | Stop reason: SDUPTHER

## 2019-08-14 RX ORDER — AMLODIPINE BESYLATE 5 MG/1
TABLET ORAL
Qty: 90 TAB | Refills: 1 | Status: SHIPPED | OUTPATIENT
Start: 2019-08-14 | End: 2019-12-13 | Stop reason: SDUPTHER

## 2019-08-14 RX ORDER — ATENOLOL 50 MG/1
TABLET ORAL
Qty: 90 TAB | Refills: 1 | Status: SHIPPED | OUTPATIENT
Start: 2019-08-14 | End: 2019-12-13 | Stop reason: SDUPTHER

## 2019-09-11 ENCOUNTER — OFFICE VISIT (OUTPATIENT)
Dept: FAMILY MEDICINE CLINIC | Age: 59
End: 2019-09-11

## 2019-09-11 ENCOUNTER — HOSPITAL ENCOUNTER (OUTPATIENT)
Dept: LAB | Age: 59
Discharge: HOME | End: 2019-09-11

## 2019-09-11 VITALS
HEIGHT: 62 IN | OXYGEN SATURATION: 95 % | TEMPERATURE: 98.1 F | WEIGHT: 195 LBS | HEART RATE: 66 BPM | DIASTOLIC BLOOD PRESSURE: 71 MMHG | BODY MASS INDEX: 35.88 KG/M2 | SYSTOLIC BLOOD PRESSURE: 136 MMHG

## 2019-09-11 DIAGNOSIS — J45.40 MODERATE PERSISTENT ASTHMA WITHOUT COMPLICATION: Primary | ICD-10-CM

## 2019-09-11 DIAGNOSIS — G47.62 NOCTURNAL LEG CRAMPS: ICD-10-CM

## 2019-09-11 DIAGNOSIS — Z11.59 NEED FOR HEPATITIS C SCREENING TEST: ICD-10-CM

## 2019-09-11 LAB
ANION GAP SERPL CALC-SCNC: 9 MMOL/L (ref 5–15)
BUN SERPL-MCNC: 12 MG/DL (ref 6–20)
BUN/CREAT SERPL: 22 (ref 12–20)
CALCIUM SERPL-MCNC: 9 MG/DL (ref 8.5–10.1)
CHLORIDE SERPL-SCNC: 107 MMOL/L (ref 97–108)
CO2 SERPL-SCNC: 26 MMOL/L (ref 21–32)
CREAT SERPL-MCNC: 0.54 MG/DL (ref 0.55–1.02)
GLUCOSE SERPL-MCNC: 83 MG/DL (ref 65–100)
MAGNESIUM SERPL-MCNC: 2.1 MG/DL (ref 1.6–2.4)
POTASSIUM SERPL-SCNC: 4.4 MMOL/L (ref 3.5–5.1)
SODIUM SERPL-SCNC: 142 MMOL/L (ref 136–145)

## 2019-09-11 PROCEDURE — 83735 ASSAY OF MAGNESIUM: CPT

## 2019-09-11 PROCEDURE — 86803 HEPATITIS C AB TEST: CPT

## 2019-09-11 PROCEDURE — 80048 BASIC METABOLIC PNL TOTAL CA: CPT

## 2019-09-11 RX ORDER — MONTELUKAST SODIUM 10 MG/1
10 TABLET ORAL DAILY
Qty: 90 TAB | Refills: 3 | Status: SHIPPED | OUTPATIENT
Start: 2019-09-11 | End: 2019-12-13 | Stop reason: SINTOL

## 2019-09-11 NOTE — PROGRESS NOTES
At discharge station AVS was printed and reviewed with pt. Provided pt with information and phone # for [facility]. Explained that they will do a financial screening before scheduling appt. Pt given Good RX  today for her Singulair. She will call us back if over income for [facility] and I gave her Card Card application in case she needs mammogram done through [facility] without [facility] assistance. Becki Fernández RN  .

## 2019-09-11 NOTE — PROGRESS NOTES
Assessment/Plan:       ICD-10-CM ICD-9-CM    1. Moderate persistent asthma without complication W52.33 246.43 montelukast (SINGULAIR) 10 mg tablet   2. Need for hepatitis C screening test Z11.59 V73.89 HEPATITIS C AB   3. Nocturnal leg cramps I90.05 789.72 METABOLIC PANEL, BASIC      MAGNESIUM     Follow-up and Dispositions    · Return in about 3 months (around 12/11/2019). [facility] [id number] [place] [address]  Phone: [phone] Fax: [phone]      [facility]  Subjective:     Chief Complaint   Patient presents with    Follow-up     asthma and BP    Alejandra Morales is a 63 y.o. BLACK [place]  female. HPI: Her income was too high - she did not qualify for [employer], so she did not get Advair. This week has been worse than usual - using albuterol inhaler daily up to q4h. States she is taking a generic allergy pill which is currently in her car - Dr. Carlee Whitman had recommended she take Zyrtec. Also discussed Dr. Hannah Marrufo recommendation to add singulair. She also complains of leg cramps, especially on the right side, at night. Took some mustard last night as she heard that can be helpful. Is asking if her potassium or magnesium could be contributing. She  has a past medical history of Asthma, Diverticulosis, and Essential hypertension. Review of Systems: Negative for: fever, chest pain, shortness of breath, leg swelling, exertional dyspnea, palpitations. Current Medications:   Current Outpatient Medications on File Prior to Visit   Medication Sig    atenolol (TENORMIN) 50 mg tablet TAKE 1 TABLET BY MOUTH EVERY DAY    amLODIPine (NORVASC) 5 mg tablet TAKE 1 TABLET BY MOUTH EVERY DAY    cloNIDine HCl (CATAPRES) 0.1 mg tablet 1 at hs    albuterol (PROVENTIL HFA, VENTOLIN HFA, PROAIR HFA) 90 mcg/actuation inhaler Take 1 Puff by inhalation every six (6) hours as needed for Wheezing.     albuterol (VENTOLIN HFA) 90 mcg/actuation inhaler Take 1 Puff by inhalation every four (4) hours as needed for Wheezing.  cetirizine (ZYRTEC) 10 mg tablet Take 1 Tab by mouth nightly. No current facility-administered medications on file prior to visit. Social History: She  reports that she quit smoking about 22 years ago. Her smoking use included cigarettes. She has never used smokeless tobacco. She reports that she does not drink alcohol or use drugs. Objective:     Vitals:    09/11/19 1044   BP: 136/71   Pulse: 66   Temp: 98.1 °F (36.7 °C)   TempSrc: Oral   SpO2: 95%   Weight: 195 lb (88.5 kg)   Height: 5' 2.01\" (1.575 m)    Patient's last menstrual period was 10/25/2013. Wt Readings from Last 2 Encounters:   09/11/19 195 lb (88.5 kg)   06/26/19 192 lb (87.1 kg)     No results found for any visits on 09/11/19. Physical Examination:  General appearance - well developed, no acute distress. Chest - clear to auscultation. Heart - regular rate and rhythm without murmurs, rubs, or gallops. Abdomen - bowel sounds present x 4, NT, ND. Extremities - pulses intact. No peripheral edema. Assessment/Plan:   Diagnoses and all orders for this visit:    1. Moderate persistent asthma without complication  -     montelukast (SINGULAIR) 10 mg tablet; Take 1 Tab by mouth daily. Take one daily for allergies and asthma    2. Need for hepatitis C screening test  -     HEPATITIS C AB; Future    3. Nocturnal leg cramps  -     METABOLIC PANEL, BASIC; Future  -     MAGNESIUM; Future    She is not on any obvious potassium depleting medications. I discussed the high sodium content in mustard as well as her other question about taking pickle juice, which I did not recommend due to the sodium. We will check electrolytes and add Mg2+ and call if abnormal.  Routine follow up 3 months. Follow-up and Dispositions    · Return in about 3 months (around 12/11/2019).        Lizandro Mcadams, JACKIE, FNP-BC, BC-ADM  Devan Serra expressed understanding of this plan.

## 2019-09-11 NOTE — PROGRESS NOTES
Coordination of Care  1. Have you been to the ER, urgent care clinic since your last visit? Hospitalized since your last visit? No    2. Have you seen or consulted any other health care providers outside of the [address] [place] [address] since your last visit? Include any pap smears or colon screening. No    Does the patient need refills? NO    Learning Assessment Complete?  yes  Depression Screening complete in the past 12 months? yes

## 2019-09-12 LAB
HCV AB SERPL QL IA: NONREACTIVE
HCV COMMENT,HCGAC: NORMAL

## 2019-09-15 NOTE — PROGRESS NOTES
Magnesium is normal.  You do not have Hepatitis C. Your metabolic panel is normal for kidney function, glucose, potassium. Your \"BUN/Creatinine ratio\" of 22 indicates mild dehydration. Please drink more water.

## 2019-12-13 ENCOUNTER — TELEPHONE (OUTPATIENT)
Dept: FAMILY MEDICINE CLINIC | Age: 59
End: 2019-12-13

## 2019-12-13 ENCOUNTER — HOSPITAL ENCOUNTER (OUTPATIENT)
Dept: MAMMOGRAPHY | Age: 59
Discharge: HOME | End: 2019-12-13

## 2019-12-13 ENCOUNTER — OFFICE VISIT (OUTPATIENT)
Dept: FAMILY MEDICINE CLINIC | Age: 59
End: 2019-12-13

## 2019-12-13 ENCOUNTER — OFFICE VISIT (OUTPATIENT)
Dept: FAMILY PLANNING/WOMEN'S HEALTH CLINIC | Age: 59
End: 2019-12-13

## 2019-12-13 VITALS
OXYGEN SATURATION: 97 % | WEIGHT: 200 LBS | DIASTOLIC BLOOD PRESSURE: 70 MMHG | SYSTOLIC BLOOD PRESSURE: 133 MMHG | HEIGHT: 62 IN | HEART RATE: 63 BPM | BODY MASS INDEX: 36.8 KG/M2 | TEMPERATURE: 98.2 F

## 2019-12-13 DIAGNOSIS — Z12.31 VISIT FOR SCREENING MAMMOGRAM: ICD-10-CM

## 2019-12-13 DIAGNOSIS — I10 ESSENTIAL HYPERTENSION WITH GOAL BLOOD PRESSURE LESS THAN 140/90: ICD-10-CM

## 2019-12-13 DIAGNOSIS — Z01.419 ENCOUNTER FOR WELL WOMAN EXAM: Primary | ICD-10-CM

## 2019-12-13 DIAGNOSIS — N95.1 SYMPTOMS, SUCH AS FLUSHING, SLEEPLESSNESS, HEADACHE, LACK OF CONCENTRATION, ASSOCIATED WITH THE MENOPAUSE: ICD-10-CM

## 2019-12-13 DIAGNOSIS — J45.40 MODERATE PERSISTENT ASTHMA WITHOUT COMPLICATION: Primary | ICD-10-CM

## 2019-12-13 DIAGNOSIS — Z23 ENCOUNTER FOR IMMUNIZATION: ICD-10-CM

## 2019-12-13 PROCEDURE — 77067 SCR MAMMO BI INCL CAD: CPT

## 2019-12-13 RX ORDER — ATENOLOL 50 MG/1
TABLET ORAL
Qty: 90 TAB | Refills: 3 | Status: SHIPPED | OUTPATIENT
Start: 2019-12-13 | End: 2020-09-29 | Stop reason: SDUPTHER

## 2019-12-13 RX ORDER — FLUTICASONE PROPIONATE AND SALMETEROL 113; 14 UG/1; UG/1
1 POWDER, METERED RESPIRATORY (INHALATION) 2 TIMES DAILY
Qty: 1 EACH | Refills: 11 | Status: SHIPPED | OUTPATIENT
Start: 2019-12-13 | End: 2020-11-24 | Stop reason: SDUPTHER

## 2019-12-13 RX ORDER — CLONIDINE HYDROCHLORIDE 0.1 MG/1
TABLET ORAL
Qty: 90 TAB | Refills: 3 | Status: SHIPPED | OUTPATIENT
Start: 2019-12-13 | End: 2021-02-24 | Stop reason: SDUPTHER

## 2019-12-13 RX ORDER — ALBUTEROL SULFATE 90 UG/1
1 AEROSOL, METERED RESPIRATORY (INHALATION)
Qty: 1 INHALER | Refills: 3 | Status: SHIPPED | OUTPATIENT
Start: 2019-12-13 | End: 2020-10-08 | Stop reason: SDUPTHER

## 2019-12-13 RX ORDER — AMLODIPINE BESYLATE 5 MG/1
TABLET ORAL
Qty: 90 TAB | Refills: 3 | Status: SHIPPED | OUTPATIENT
Start: 2019-12-13 | End: 2020-09-29 | Stop reason: SDUPTHER

## 2019-12-13 NOTE — TELEPHONE ENCOUNTER
Per provider patient was given an tickler for 11 months, patient is asking if she would need to be sooner to check her blood pressure. Patient states that she was being seen kaylie three months to monitor her BP. Routing to provider for review. Cara Nunes, if you would like to see patient back for BP control in 3 or 6 months please send to registration so that they can put in tickler and call patient to schedule appt. And to cancel the tickler for 11 months. Thanks.    Brandon Wilson RN

## 2019-12-13 NOTE — PROGRESS NOTES
Assessment/Plan:    Diagnoses and all orders for this visit:    1. Encounter for well woman exam            FARIDA Goff expressed understanding of this plan. An AVS was printed and given to the patient.      ----------------------------------------------------------------------    No chief complaint on file. History of Present Illness:    Had hysterectomy for endometriosis in  or so. Doing well since then. No breast complaints  She has q 5 years colonoscopy's, thinks that she is due soon for her next one. No rectal complaints, no constipation  Has an occasional left lower abd pain. Does not think it has to do with her bowel movements. Has only one ovary and I think she said that she had her right ovary removed. I encouraged her to f/up if the sxs persist        Past Medical History:   Diagnosis Date    Asthma     Diverticulosis     on colonoscopy     Essential hypertension        Current Outpatient Medications   Medication Sig Dispense Refill    fluticasone propion-salmeterol (AIRDUO RESPICLICK) 697-96 mcg/actuation aepb Take 1 Inhalation by inhalation two (2) times a day. Indications: controller medication for asthma 1 Each 11    atenolol (TENORMIN) 50 mg tablet TAKE 1 TABLET BY MOUTH EVERY DAY 90 Tab 3    amLODIPine (NORVASC) 5 mg tablet TAKE 1 TABLET BY MOUTH EVERY DAY 90 Tab 3    cloNIDine HCl (CATAPRES) 0.1 mg tablet 1 at hs 90 Tab 3    albuterol (PROVENTIL HFA, VENTOLIN HFA, PROAIR HFA) 90 mcg/actuation inhaler Take 1 Puff by inhalation every six (6) hours as needed for Wheezing. 1 Inhaler 3    cetirizine (ZYRTEC) 10 mg tablet Take 1 Tab by mouth nightly.  30 Tab 11       Allergies   Allergen Reactions    Other Medication Itching     Powder in gloves       Social History     Tobacco Use    Smoking status: Former Smoker     Types: Cigarettes     Last attempt to quit: 7/3/1997     Years since quittin.4    Smokeless tobacco: Never Used    Tobacco comment: Quit in 37s. Substance Use Topics    Alcohol use: No     Alcohol/week: 0.0 standard drinks    Drug use: No       Family History   Problem Relation Age of Onset    Diabetes Mother     Cancer Mother 66        breast cancer    Breast Cancer Mother 68    Cancer Maternal Aunt         2 with breast cancer    Breast Cancer Maternal Aunt     Stroke Maternal Grandmother     Breast Cancer Maternal Aunt        Physical Exam:     Visit Vitals  LMP 10/25/2013       A&Ox3  WDWN NAD  Respirations normal and non labored  Breast itzel neg for mass, tenderness, skin color changes, dimpling or retractions  Pelvic- ext neg for lesion or discharge. Vaginal vault w/out lesions, scant discharge present that appears to be fungal but pt asx. I have advised that she look for sxs.   Uterus and cervix surgically absent  Rectal deferred- she needs to have her f/up colonoscopy

## 2019-12-13 NOTE — PROGRESS NOTES
Coordination of Care  1. Have you been to the ER, urgent care clinic since your last visit? Hospitalized since your last visit? No    2. Have you seen or consulted any other health care providers outside of the [address] [place] [address] since your last visit? Include any pap smears or colon screening. No    Does the patient need refills? YES    Learning Assessment Complete?  yes  Depression Screening complete in the past 12 months? yes

## 2019-12-13 NOTE — PROGRESS NOTES
EVERY WOMANS LIFE HISTORY QUESTIONNAIRE       No Yes Comments   Has a doctor ever seen or felt anything wrong with your breast? [x]                                  []                                     Have you ever had a breast biopsy? [x]                                  []                                          When and where was last mammogram performed? 10/2018 at [facility]    Have you ever been told that there was a problem on your mammogram?   No Yes Comments   [x]                                  []                                       Do you have breast implants? No Yes Comments   [x]                                  []                                       When was your last Pap test performed? 1/2012 at [address] (vaginal)    Have you ever had an abnormal Pap test?   No Yes Comments   [x]                                  []                                       Have you had a hysterectomy? No Yes Comments (why)   []                                  [x]                                  1997 for endometriosis,  Still has 1 ovary per pt     Have you been through menopause? No Yes Date of LMP   []                                  [x]                                  See above note     Did your mother take JATIN? No Yes Unknown   [x]                                  []                                       Do you have a history of HIV exposure? No Yes    [x]                                  []                                       Have you ever been diagnosed with any type of Cancer   No Yes Comments (type,when,where,type of treatment   [x]                                  []                                          Has a family member been diagnosed with breast or ovarian cancer?    No Yes Comments (which family members, and type   []                                  [x]                                  Mother with breast cancer at 78yrs  2/9 maternal aunts with breast cancer in their 63's     Are you taking hormone replacement therapy (HRT)     No Yes Comments   [x]                                  []                                       How many times have you been pregnant? 4      Number of live births ? 2    Are you experiencing any of the following? No Yes Comments   Nipple Discharge [x]                                  []                                     Breast Lump/Masses [x]                                  []                                     Breast Skin Changes [x]                                  []                                          No Yes Comments   Vaginal Discharge [x]                                  []                                     Abnormal/unusual vaginal bleeding [x]                                  []                                         Are you experiencing any other health problems? Asthma, HTN---goes to the [facility]        Age at first period?  17  Age at first birth?  24    Ht-- 5' 2\"    Wt-- 192#

## 2019-12-13 NOTE — PROGRESS NOTES
Reviewed AVS, prescription and pharmacy location with patient, patient will go to [facility] and request the Natanael Eldridge to be transferred to that pharmacy from [facility] . AVS printed and given along with goodrx coupons. Patient aware that provider would like to follow up about today's visit in 11 months . This has been fully explained to the patient, who indicates understanding and agrees with plan. No further questions at this time. Betsy Melendrez RN     A tel. Encounter has been sent to the provider, patient asking if she may need to be seen sooner for blood pressure control management. Flu Immunization given per provider order following policy and protocol. Please see scanned consent form. VIS given. No contraindications to vaccines. Documented in [facility]. Instructions for adverse reactions discussed. Explained that if symptoms (rash, hives SOB, temperature higher of 102'f) to go to the nearest ER. Patient instructed to wait in the clinic for 15 minutes  to observe for any signs of immediate reaction. Told to tell a nurse immediately if reaction occur; if no change and felt well, it was OK to leave after 15 min. Patient expressed understanding. No adverse reaction noted at time of discharge from vaccine area.

## 2019-12-13 NOTE — PROGRESS NOTES
Assessment/Plan:       ICD-10-CM ICD-9-CM    1. Moderate persistent asthma without complication V18.07 623.59 fluticasone propion-salmeterol (AIRDUO RESPICLICK) 791-58 mcg/actuation aepb      albuterol (PROVENTIL HFA, VENTOLIN HFA, PROAIR HFA) 90 mcg/actuation inhaler   2. Essential hypertension with goal blood pressure less than 140/90 I10 401.9 atenolol (TENORMIN) 50 mg tablet      amLODIPine (NORVASC) 5 mg tablet   3. Symptoms, such as flushing, sleeplessness, headache, lack of concentration, associated with the menopause N95.1 627.2 cloNIDine HCl (CATAPRES) 0.1 mg tablet   4. Encounter for immunization Z23 V03.89 INFLUENZA VIRUS VAC QUAD,SPLIT,PRESV FREE SYRINGE IM     Follow-up and Dispositions    · Return for asthma, HTN, primary care 6 mos-1 year, dietician appointment soon. She was overqualified for both [facility] and PAP (checked into PAP today with Soni) as a single household earner. [facility] [address] [place] [id number]  Phone: [phone] Fax: [phone]      [facility]  Subjective:     Chief Complaint   Patient presents with    Hypertension     f/u, room #4    Weight Gain     ptis concerned with her weight.  Immunization/Injection    Jerry Casas is a 63 y.o. BLACK OR  female. HPI: side effects to singulair so not taking it; uses albuterol every day, sometimes twice in the cold weather; since she is not on the Advair  Cetirizine - take qhs  Yesterday ate tuna salad, cauliflower pizza, went to the gym for an hour yesterday; clothes are not tight or loose  Started drinking Aloe juice. She  has a past medical history of Asthma, Diverticulosis, and Essential hypertension. Review of Systems: Negative for: fever, chest pain, shortness of breath, leg swelling, exertional dyspnea, palpitations.   Current Medications:   Current Outpatient Medications on File Prior to Visit   Medication Sig    [DISCONTINUED] montelukast (SINGULAIR) 10 mg tablet Take 1 Tab by mouth daily. Take one daily for allergies and asthma    [DISCONTINUED] atenolol (TENORMIN) 50 mg tablet TAKE 1 TABLET BY MOUTH EVERY DAY    [DISCONTINUED] amLODIPine (NORVASC) 5 mg tablet TAKE 1 TABLET BY MOUTH EVERY DAY    [DISCONTINUED] cloNIDine HCl (CATAPRES) 0.1 mg tablet 1 at hs    cetirizine (ZYRTEC) 10 mg tablet Take 1 Tab by mouth nightly.  [DISCONTINUED] albuterol (PROVENTIL HFA, VENTOLIN HFA, PROAIR HFA) 90 mcg/actuation inhaler Take 1 Puff by inhalation every six (6) hours as needed for Wheezing.  [DISCONTINUED] albuterol (VENTOLIN HFA) 90 mcg/actuation inhaler Take 1 Puff by inhalation every four (4) hours as needed for Wheezing. No current facility-administered medications on file prior to visit. Social History: She  reports that she quit smoking about 22 years ago. Her smoking use included cigarettes. She has never used smokeless tobacco. She reports that she does not drink alcohol or use drugs. Objective:     Vitals:    12/13/19 0843   BP: 133/70   Pulse: 63   Temp: 98.2 °F (36.8 °C)   TempSrc: Oral   SpO2: 97%   Weight: 200 lb (90.7 kg)   Height: 5' 2.01\" (1.575 m)    Patient's last menstrual period was 10/25/2013. Wt Readings from Last 2 Encounters:   12/13/19 200 lb (90.7 kg)   09/11/19 195 lb (88.5 kg)     No results found for any visits on 12/13/19. Physical Examination:  General appearance - well developed, no acute distress. Chest - clear to auscultation. Heart - regular rate and rhythm without murmurs, rubs, or gallops. Abdomen - bowel sounds present x 4, NT, ND. Extremities - pulses intact. No peripheral edema. Assessment/Plan:   Diagnoses and all orders for this visit:    1. Moderate persistent asthma without complication  -     fluticasone propion-salmeterol (AIRDUO RESPICLICK) 069-68 mcg/actuation aepb; Take 1 Inhalation by inhalation two (2) times a day.  Indications: controller medication for asthma  -     albuterol (PROVENTIL HFA, VENTOLIN HFA, PROAIR HFA) 90 mcg/actuation inhaler; Take 1 Puff by inhalation every six (6) hours as needed for Wheezing. 2. Essential hypertension with goal blood pressure less than 140/90  -     atenolol (TENORMIN) 50 mg tablet; TAKE 1 TABLET BY MOUTH EVERY DAY  -     amLODIPine (NORVASC) 5 mg tablet; TAKE 1 TABLET BY MOUTH EVERY DAY    3. Symptoms, such as flushing, sleeplessness, headache, lack of concentration, associated with the menopause  -     cloNIDine HCl (CATAPRES) 0.1 mg tablet; 1 at hs    4. Encounter for immunization  -     INFLUENZA VIRUS VAC QUAD,SPLIT,PRESV FREE SYRINGE IM      Follow-up and Dispositions    · Return for asthma, HTN, primary care 6 mos-1 year, dietician appointment soon. Bhavya Welsh, DNP, FNP-BC, BC-ADM  Dearjose Lay expressed understanding of this plan.

## 2019-12-14 NOTE — PROGRESS NOTES
Sonido Rowe, your mammogram was normal.  Your next one is due in 1 year.   Leonel Whitehead, MIGUELINA

## 2019-12-18 ENCOUNTER — OFFICE VISIT (OUTPATIENT)
Dept: FAMILY MEDICINE CLINIC | Age: 59
End: 2019-12-18

## 2019-12-18 VITALS — WEIGHT: 196.6 LBS | BODY MASS INDEX: 35.95 KG/M2

## 2019-12-18 DIAGNOSIS — Z71.3 DIETARY COUNSELING AND SURVEILLANCE: Primary | ICD-10-CM

## 2019-12-18 NOTE — PROGRESS NOTES
DATE: 2019      REFERRING PHYSICIAN: Hank Mccracken NP  NAME: Adwoa Arita : 1960 AGE: 63 y.o. GENDER: female  REASON FOR VISIT: Weight Management  ASSESSMENT: Eros Cain visits today because she was concerned about her weight at her last visit, with the Jackie Gomes. Past Medical Hx: Hypertension, Severe Obesity      LABS: No recent pertinent labs to review. MEDICATIONS/SUPPLEMENTS:   [unfilled]  Prior to Admission medications    Medication Sig Start Date End Date Taking? Authorizing Provider   fluticasone propion-salmeterol (AIRDUO RESPICLICK) 373-54 mcg/actuation aepb Take 1 Inhalation by inhalation two (2) times a day. Indications: controller medication for asthma 19   Grace Swenson NP   atenolol (TENORMIN) 50 mg tablet TAKE 1 TABLET BY MOUTH EVERY DAY 19   Mitch KUNZ NP   amLODIPine (NORVASC) 5 mg tablet TAKE 1 TABLET BY MOUTH EVERY DAY 19   Mitch KUNZ NP   cloNIDine HCl (CATAPRES) 0.1 mg tablet 1 at hs 19   Grace Swenson NP   albuterol (PROVENTIL HFA, VENTOLIN HFA, PROAIR HFA) 90 mcg/actuation inhaler Take 1 Puff by inhalation every six (6) hours as needed for Wheezing. 19   Grace Swenson NP   cetirizine (ZYRTEC) 10 mg tablet Take 1 Tab by mouth nightly. 19   Ben Marquez MD       FOOD ALLERGIES/INTOLERANCES: None.    ANTHROPOMETRICS:    Ht Readings from Last 1 Encounters:   19 5' 2.01\" (1.575 m)      Wt Readings from Last 1 Encounters:   19 200 lb (90.7 kg)      BMI: 35.95 Category: Obesity Class II    Reported Wt Hx:  190 lbs (2019) - Weight fluctuates in the 190 - 199 lbs range per patient    Estimated Nutritional Needs: 5528 kcals/day ([place] [address] Jeor - 1.2 Activity Factor)    Reported Diet Hx:     24 Hour Diet Recall  Breakfast  V8 juice,  Egg fu lilli jesenia, fried spianch, and asparagus, and rice w/ a little gravy   Lunch  Chick-Omar-A w/ [place] with just pickle, 1 waffle frie, Diet Lemonade   Dinner  Percy portillo Snacks  10 Chocolate almoond dove candys   Beverages  4 16 oz water bottles, and a Sweet Tea       Exercise/Physical Actvity:  She gets some physical activity in 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 She use to regularly go to [facility], but stop after a death in the family. Environmental/Social:Has access to multiple grocery stores. NUTRITION INTERVENTION:    Talked about the health benefits of weight loss, and discussed how diet changes and exercise can result in weight loss. Explained that it is best to set small goals and gradually lose weight. Helped to brainstorm specific goals to accomplish before next appointment. Take homes: My Healthy Plate, I Can Do it, Reusable Snack Bag. Delano Wild was able to brainstorm a list of goals to help meet her weight loss goal. She would like to weigh 140 lbs with in 6 months. She does like to plan ahead. She will be trying an alternative fruit and vegetable smoothie,instead of her typical V8 (with a suspected 920mg of sodium) in the morning. Encouraged her to continue focus on drinking water. Discussed visiting RD once a month while striving to achieve weight loss, as well as finding loved ones to encourage and her hold accountable for lifestyle changes. PATIENT GOALS:  - 1/2 portions sizes of entrees' at [address] places  - Not ordering fries or sugar sweetened beverages when going out to eat  - 20 minutes for at least 5 days a week at [facility]    Specific tips and techniques to facilitate compliance with above recommendations were provided and discussed. Pt was strongly encouraged to begin making necessary changes now. Will follow-up in 1 month to review progress towards goals and monitor weight.             Sherren Rota, RD

## 2020-01-16 ENCOUNTER — OFFICE VISIT (OUTPATIENT)
Dept: FAMILY MEDICINE CLINIC | Age: 60
End: 2020-01-16

## 2020-01-16 VITALS — BODY MASS INDEX: 36.24 KG/M2 | WEIGHT: 198.2 LBS

## 2020-01-16 DIAGNOSIS — Z71.3 DIETARY COUNSELING AND SURVEILLANCE: Primary | ICD-10-CM

## 2020-01-16 NOTE — PROGRESS NOTES
DATE: 2020      REFERRING PHYSICIAN: Daljit Wallace NP  NAME: Cherri Ramirez : 1960 AGE: 63 y.o. GENDER: female  REASON FOR VISIT: Weight Management     ASSESSMENT:  Irena Dow shares that she has not been making progress towards her goal and was hesitant to have her weight taken. There have been changes in family circumstances that have changed her schedule making it harder to go the gym. She shares that she knows what she has to do and that she knows she can do it. ANTHROPOMETRICS:    WT / BMI 2019   WEIGHT 198 lb 3.2 oz 196 lb 9.6 oz 200 lb 195 lb   BODY MASS INDEX 36.24 kg/m2 35.95 kg/m2 36.57 kg/m2 35.66 kg/m2       NUTRITION INTERVENTION:  Encouraged Irena Dow for eliminating sugar-sweetened beverages while eating out. Suggested setting new goals that may be more realistic and attainable for Irena Dow. Suggested preparing for meals through out the day by packing a balanced lower calorie snack, and by looking at menus for healthier options since she is on the go so much. Encouraged Irena Dow for buying exercise equipment, and brainstorming of new exercise goals. PREVIOUS PATIENT GOALS:  - 1/2 portions sizes of entrees' at [employer] places  - Not ordering fries or sugar sweetened beverages when going out to eat  - 20 minutes for at least 5 days a week at [address] of PATIENT GOALS:  - Unmet: hasn't been eating half and saving half for later  - Partially met: She hasn't been ordering sugary sweetened beverages, but has not ordered only the entree  - Unmet: Has not been to the gym    NEW PATIENT GOALS:  - 500 calories or less at the fast food restaurants  - packing a balance snack to go  - 20 minutes of you tube cardio, trampoline or gym exercise     Specific tips and techniques to facilitate compliance with above recommendations were provided and discussed. Pt was strongly encouraged to begin making necessary changes now.  Will follow-up with dietitian in 1 month to review progress towards new goals, and monitor weight.            Marva Johnston RD

## 2020-02-19 ENCOUNTER — TELEPHONE (OUTPATIENT)
Dept: FAMILY MEDICINE CLINIC | Age: 60
End: 2020-02-19

## 2020-02-19 NOTE — TELEPHONE ENCOUNTER
Eleuterio Schirmer  Call main office 2/19/2020 @ 11: 00 Left a VM . Patient call was returned 11;05 am patient did not answer  Patient stated that she would like to cancel her apt that she has on Friday 2/21/2020 with NT .     Thank you   Alfonso Kapadia

## 2020-03-18 ENCOUNTER — TELEPHONE (OUTPATIENT)
Dept: FAMILY MEDICINE CLINIC | Age: 60
End: 2020-03-18

## 2020-03-18 NOTE — TELEPHONE ENCOUNTER
Sallie Nesbitt  Call main office 3/18/2020 wanted to make an apt with provider MEENU . Patient stated that she is not feeling well at all .     Thank you   Antonio Majano

## 2020-03-18 NOTE — TELEPHONE ENCOUNTER
Tc to the pt she stated she had been around her granddaughter with fever and ear ache. granddaughter was taken to the ed. Neg coronavirus. Her mother had a very bad cough and she tested neg for coronavirus. The pt wanted to know what she must do. Should she go to the ed? She had today a fever of 99.7 and yesterday her temp was 98.8. The pt was instructed to go to the ED for persistent fever that does not respond to Tylenol if she is not allergic and can tolerate it, SOB, severe pain. N/V, diarrhea. Any emergency she should go to the ed. If she got worse go to the ED. The pt stated she is not sure if this is just allergies acting up. She had taken Dayquil and she felt better. The pt was given the same day appt line # to call tomorrow for provider f/u, if not better and feels like it's allergies. Reviewed home care recommendation's with the pt. Rest, drink plenty of fluids, continue with Tylenol as needed for any fever or pain sx.

## 2020-03-19 ENCOUNTER — TELEPHONE (OUTPATIENT)
Dept: FAMILY MEDICINE CLINIC | Age: 60
End: 2020-03-19

## 2020-03-19 NOTE — TELEPHONE ENCOUNTER
Tc to the pt. She is stating she has temp of 99.6, she is congested, and runny nose and sore throat. She also has hx of asthma. She believes it is allergies but this is setting off her asthma. Th ept was asked if she had called the same day phone appt line. She stated she did and was told a nurse would call her back. Th ept was told to go to the ED if she was having SOB, severe pain, persistent fever, chest pain, N/V, diarrhea. Ht ept stated she was not having any of these. The pt was told to go to the Urgent care for care.  Ivette Lopez RN

## 2020-04-02 NOTE — TELEPHONE ENCOUNTER
Chart reviewed per supervisor orders. No further actions needed for this encounter. Nurse signing encounter following policy and protocol.

## 2020-07-01 ENCOUNTER — TELEPHONE (OUTPATIENT)
Dept: FAMILY MEDICINE CLINIC | Age: 60
End: 2020-07-01

## 2020-09-29 DIAGNOSIS — I10 ESSENTIAL HYPERTENSION WITH GOAL BLOOD PRESSURE LESS THAN 140/90: ICD-10-CM

## 2020-09-29 NOTE — TELEPHONE ENCOUNTER
Received a medication refill request from the pt's Pharmacy that was faxed to the [facility] main office. The medication request was for Amlodipine 5 mg, # 90, and Atenolol 50 mg, #90. The pt's LOV was 12/13/19. The last refills were ordered 12/13/19, # 90, 3 refills of both medication's. This message was routed to the provider.  Enoch Gibson RN

## 2020-10-02 RX ORDER — AMLODIPINE BESYLATE 5 MG/1
TABLET ORAL
Qty: 90 TAB | Refills: 1 | Status: SHIPPED | OUTPATIENT
Start: 2020-10-02 | End: 2021-02-24 | Stop reason: SDUPTHER

## 2020-10-02 RX ORDER — ATENOLOL 50 MG/1
TABLET ORAL
Qty: 90 TAB | Refills: 1 | Status: SHIPPED | OUTPATIENT
Start: 2020-10-02 | End: 2021-02-24 | Stop reason: SDUPTHER

## 2020-10-02 NOTE — TELEPHONE ENCOUNTER
Tc to the pt she was told about her medication refills that ahd been sent to the [facility] in [employer]. The pt stated she has switched to the Brayan Jose with the good rx coupons but that she will change them to the Brayan Jose herself. The pt asked about getting an appt with the . Nichol Cabello pt was given the same day appt line phone # to call and the protocol. The pt stated she is having her yearly thing with the season changing effecting her Asthma. Her Asthma is getting worse and she has some SOB and wheezing. The pt was advised to go to the ED if her sx did not improve or she become worse. The pt stated she would go to the Urgent care on [address] If needed. The pt was told the [facility] was only doing Covid testing at this time. The pt was disappointed in this news. The pt is using her inhaler with some relief. The pt stated she may run out of her inhaler because her mother started using one of her inhalers. The pt stated her mother is prescribed inhaler too and she did not have anymore. The pt was advised not to share her medications with other family members. The pt sounded winded on the phone. The pt was encouraged to go to the ED with SOB. The pt stated she would call the [facility] appt line early Mon and try to get an appt with the [facility] but if her sx worsened she would go to the ED.   Bhargavi Bowman RN

## 2020-10-08 ENCOUNTER — VIRTUAL VISIT (OUTPATIENT)
Dept: FAMILY MEDICINE CLINIC | Age: 60
End: 2020-10-08
Payer: COMMERCIAL

## 2020-10-08 DIAGNOSIS — J45.20 INTERMITTENT ASTHMA, UNSPECIFIED ASTHMA SEVERITY, UNSPECIFIED WHETHER COMPLICATED: Primary | ICD-10-CM

## 2020-10-08 DIAGNOSIS — J45.909 ASTHMA DUE TO SEASONAL ALLERGIES: ICD-10-CM

## 2020-10-08 DIAGNOSIS — J45.40 MODERATE PERSISTENT ASTHMA WITHOUT COMPLICATION: ICD-10-CM

## 2020-10-08 PROCEDURE — 99442 PR PHYS/QHP TELEPHONE EVALUATION 11-20 MIN: CPT | Performed by: PHYSICIAN ASSISTANT

## 2020-10-08 RX ORDER — CETIRIZINE HCL 10 MG
10 TABLET ORAL
Qty: 30 TAB | Refills: 11 | OUTPATIENT
Start: 2020-10-08 | End: 2020-10-11

## 2020-10-08 RX ORDER — PREDNISONE 10 MG/1
TABLET ORAL
Qty: 21 TAB | Refills: 0 | Status: SHIPPED | OUTPATIENT
Start: 2020-10-08 | End: 2020-10-22 | Stop reason: ALTCHOICE

## 2020-10-08 RX ORDER — ALBUTEROL SULFATE 90 UG/1
1 AEROSOL, METERED RESPIRATORY (INHALATION)
Qty: 1 INHALER | Refills: 3 | Status: SHIPPED | OUTPATIENT
Start: 2020-10-08 | End: 2021-07-14 | Stop reason: SDUPTHER

## 2020-10-08 NOTE — PROGRESS NOTES
Coordination of Care  1. Have you been to the ER, urgent care clinic since your last visit? Hospitalized since your last visit? No    2. Have you seen or consulted any other health care providers outside of the [address] [place] [address] since your last visit? Include any pap smears or colon screening. No    Does the patient need refills? NO    Learning Assessment Complete?  yes  Depression Screening complete in the past 12 months? yes

## 2020-10-08 NOTE — PROGRESS NOTES
Assessment/Plan:    Diagnoses and all orders for this visit:    1. Intermittent asthma, unspecified asthma severity, unspecified whether complicated  -     predniSONE (DELTASONE) 10 mg tablet; Si pills today then 1 less pill daily    2. Asthma due to seasonal allergies  -     predniSONE (DELTASONE) 10 mg tablet; Si pills today then 1 less pill daily  -     cetirizine (ZYRTEC) 10 mg tablet; Take 1 Tab by mouth nightly. 3. Moderate persistent asthma without complication  -     albuterol (PROVENTIL HFA, VENTOLIN HFA, PROAIR HFA) 90 mcg/actuation inhaler; Take 1 Puff by inhalation every six (6) hours as needed for Wheezing. Pt to go to ER if sxs worsen (she is at work during the phone visit and sounds like she is able to talk and work w/out any significant SOB)  She will make her annual well woman appt with [facility] soon  She does not qualify for [facility] for her albuterol so will buy it  She has a nebulizer at home and used it for the first time this morning with albuterol solution, states that she has \"plenty\" a whole box at home. Plans to use them  States that she always needs prednisone about once a year to get over these sxs     Follow-up and Dispositions    · Return in about 2 weeks (around 10/22/2020) for virtual is fine . FARIDA Narayan expressed understanding of this plan. An AVS was printed and given to the patient.      ----------------------------------------------------------------------    Chief Complaint   Patient presents with    Hypertension     Asthma, allergies f/up       History of Present Illness:    Pt called and consented to virtual telephone visit, she declined video  She was identified by name and   She is at work during the call and actually doing work while we had our visit    She is being seen today for a refill onher asthma medication. She states that every year around this time, the allergies cause her asthma to flare up.  She has been out of her albuterol MDI for some time, and didn't need it until the last few days  She feels tight in her chest and has heard wheezing. No cough or fever  Has a neb machine at home and got it out and used it this morning, this helped open her up which might be why she sounded so good on the phone with me  She agrees to ER if she worsens     Due for annual well woman where I could listen to her lungs if she got in for an appt  Explained the limitations of virtual phone visits and told her with asthma I want to be sure she is improving. Want to recheck her in 2 weeks     Past Medical History:   Diagnosis Date    Asthma     Diverticulosis     on colonoscopy 2012    Essential hypertension        Current Outpatient Medications   Medication Sig Dispense Refill    predniSONE (DELTASONE) 10 mg tablet Si pills today then 1 less pill daily 21 Tab 0    albuterol (PROVENTIL HFA, VENTOLIN HFA, PROAIR HFA) 90 mcg/actuation inhaler Take 1 Puff by inhalation every six (6) hours as needed for Wheezing. 1 Inhaler 3    cetirizine (ZYRTEC) 10 mg tablet Take 1 Tab by mouth nightly. 30 Tab 11    atenoloL (TENORMIN) 50 mg tablet TAKE 1 TABLET BY MOUTH EVERY DAY 90 Tab 1    amLODIPine (NORVASC) 5 mg tablet TAKE 1 TABLET BY MOUTH EVERY DAY 90 Tab 1    cloNIDine HCl (CATAPRES) 0.1 mg tablet 1 at hs 90 Tab 3    fluticasone propion-salmeterol (AIRDUO RESPICLICK) 477-95 mcg/actuation aepb Take 1 Inhalation by inhalation two (2) times a day. Indications: controller medication for asthma 1 Each 11       Allergies   Allergen Reactions    Other Medication Itching     Powder in gloves       Social History     Tobacco Use    Smoking status: Former Smoker     Types: Cigarettes     Last attempt to quit: 7/3/1997     Years since quittin.2    Smokeless tobacco: Never Used    Tobacco comment: Quit in 37s.    Substance Use Topics    Alcohol use: No     Alcohol/week: 0.0 standard drinks    Drug use: No       Family History Problem Relation Age of Onset    Diabetes Mother     Cancer Mother 66        breast cancer    Breast Cancer Mother 68    Cancer Maternal Aunt         2 with breast cancer    Breast Cancer Maternal Aunt     Stroke Maternal Grandmother     Breast Cancer Maternal Aunt        Physical Exam:     Visit Vitals  LMP 10/25/2013       A&Ox3  WDWN NAD  Respirations normal and non labored

## 2020-10-11 ENCOUNTER — APPOINTMENT (OUTPATIENT)
Dept: GENERAL RADIOLOGY | Age: 60
End: 2020-10-11
Attending: EMERGENCY MEDICINE

## 2020-10-11 ENCOUNTER — HOSPITAL ENCOUNTER (EMERGENCY)
Age: 60
Discharge: HOME | End: 2020-10-11
Attending: STUDENT IN AN ORGANIZED HEALTH CARE EDUCATION/TRAINING PROGRAM

## 2020-10-11 VITALS
HEART RATE: 63 BPM | DIASTOLIC BLOOD PRESSURE: 76 MMHG | TEMPERATURE: 98.6 F | SYSTOLIC BLOOD PRESSURE: 189 MMHG | RESPIRATION RATE: 20 BRPM | OXYGEN SATURATION: 98 %

## 2020-10-11 DIAGNOSIS — R06.00 DYSPNEA, UNSPECIFIED TYPE: ICD-10-CM

## 2020-10-11 DIAGNOSIS — J45.901 ACUTE EXACERBATION OF ASTHMA WITH ALLERGIC RHINITIS: Primary | ICD-10-CM

## 2020-10-11 LAB
COVID-19, XGCOVT: NOT DETECTED
HEALTH STATUS, XMCV2T: NORMAL
SOURCE, COVRS: NORMAL
SPECIMEN SOURCE, FCOV2M: NORMAL
SPECIMEN TYPE, XMCV1T: NORMAL

## 2020-10-11 PROCEDURE — 99283 EMERGENCY DEPT VISIT LOW MDM: CPT

## 2020-10-11 PROCEDURE — 87635 SARS-COV-2 COVID-19 AMP PRB: CPT

## 2020-10-11 PROCEDURE — 71046 X-RAY EXAM CHEST 2 VIEWS: CPT

## 2020-10-11 PROCEDURE — 74011250637 HC RX REV CODE- 250/637: Performed by: EMERGENCY MEDICINE

## 2020-10-11 RX ORDER — DEXAMETHASONE SODIUM PHOSPHATE 10 MG/ML
10 INJECTION INTRAMUSCULAR; INTRAVENOUS
Status: COMPLETED | OUTPATIENT
Start: 2020-10-11 | End: 2020-10-11

## 2020-10-11 RX ORDER — AZITHROMYCIN 250 MG/1
TABLET, FILM COATED ORAL
Qty: 6 TAB | Refills: 0 | Status: SHIPPED | OUTPATIENT
Start: 2020-10-11 | End: 2020-10-16

## 2020-10-11 RX ORDER — CETIRIZINE HCL 10 MG
10 TABLET ORAL DAILY
Qty: 20 TAB | Refills: 0 | Status: SHIPPED | OUTPATIENT
Start: 2020-10-11

## 2020-10-11 RX ORDER — CETIRIZINE HCL 10 MG
10 TABLET ORAL
Status: COMPLETED | OUTPATIENT
Start: 2020-10-11 | End: 2020-10-11

## 2020-10-11 RX ORDER — FAMOTIDINE 20 MG/1
20 TABLET, FILM COATED ORAL
Status: COMPLETED | OUTPATIENT
Start: 2020-10-11 | End: 2020-10-11

## 2020-10-11 RX ADMIN — DEXAMETHASONE SODIUM PHOSPHATE 10 MG: 10 INJECTION, SOLUTION INTRAMUSCULAR; INTRAVENOUS at 10:25

## 2020-10-11 RX ADMIN — CETIRIZINE HYDROCHLORIDE 10 MG: 10 TABLET, FILM COATED ORAL at 10:25

## 2020-10-11 RX ADMIN — FAMOTIDINE 20 MG: 20 TABLET ORAL at 10:24

## 2020-10-11 NOTE — ED TRIAGE NOTES
Arrived from home with granddaughter c/o SOB one week ago. Asthma excarbation one week ago and started Prednisone. Breathing treatments ineffective. Started Prednisone Thursday and albuterol treatments have been more frequently.

## 2020-10-11 NOTE — DISCHARGE INSTRUCTIONS
Patient Education     Learning About Asthma Triggers  What are triggers? When you have asthma, certain things can make your symptoms worse. These are called triggers. They include:  · Cigarette smoke or air pollution. · Things you are allergic to, such as:  ¨ Pollen, mold, or dust mites. ¨ Pet hair, skin, or saliva. · Illnesses, like colds, flu, or pneumonia. · Exercise. · Dry, cold air. How do triggers affect asthma? Triggers can make it harder for your lungs to work as they should and can lead to sudden difficulty breathing and other symptoms. When you are around a trigger, an asthma attack is more likely. If your symptoms are severe, you may need emergency treatment or have to go to the hospital for treatment. If you know what your triggers are and can avoid them, you may be able to prevent asthma attacks, reduce how often you have them, and make them less severe. What can you do to avoid triggers? The first thing is to know your triggers. When you are having symptoms, note the things around you that might be causing them. Then look for patterns in what may be triggering your symptoms. Record your triggers on a piece of paper or in an asthma diary. When you have your list of possible triggers, work with your doctor to find ways to avoid them. You also can check how well your lungs are working by measuring your peak expiratory flow (PEF) throughout the day. Your PEF may drop when you are near things that trigger symptoms. Here are some ways to avoid a few common triggers. · Do not smoke or allow others to smoke around you. If you need help quitting, talk to your doctor about stop-smoking programs and medicines. These can increase your chances of quitting for good. · If there is a lot of pollution, pollen, or dust outside, stay at home and keep your windows closed. Use an air conditioner or air filter in your home.  Check your local weather report or newspaper for air quality and pollen reports. · Get a flu shot every year. Talk to your doctor about getting a pneumococcal shot. Wash your hands often to prevent infections. · Avoid exercising outdoors in cold weather. If you are outdoors in cold weather, wear a scarf around your face and breathe through your nose. How can you manage an asthma attack? · If you have an asthma action plan, follow the plan. In general:  ¨ Use your quick-relief inhaler as directed by your doctor. If your symptoms do not get better after you use your medicine, have someone take you to the emergency room. Call an ambulance if needed. ¨ If your doctor has given you other inhaled medicines or steroid pills, take them as directed. Where can you learn more? Go to Tagwhat.be  Enter M564 in the search box to learn more about \"Learning About Asthma Triggers. \"   © 3174-1886 Healthwise, Incorporated. Care instructions adapted under license by [facility] [employer] (which disclaims liability or warranty for this information). This care instruction is for use with your licensed healthcare professional. If you have questions about a medical condition or this instruction, always ask your healthcare professional. [place] [address] any warranty or liability for your use of this information. Content Version: 73.9.646153; Last Revised: February 23, 2012                 Patient Education        COPD and Asthma: Care Instructions  Your Care Instructions     Some people who have chronic obstructive pulmonary disease (COPD) also have asthma. Both of these problems can damage your lungs. This makes it very important to control them. Asthma causes the airways that lead to the lungs to swell and become narrow. This makes it hard to breathe. You may wheeze or cough. If you have a bad attack, you may need emergency care. There are two parts to treating asthma. · Controlling asthma over the long term. · Treating attacks when they occur.   You and your doctor can make an asthma treatment plan that will help. This plan tells you the medicines you take every day to reduce the swelling in your airways and prevent attacks. It also tells you what to do if you have an asthma attack. Follow-up care is a key part of your treatment and safety. Be sure to make and go to all appointments, and call your doctor if you are having problems. It's also a good idea to know your test results and keep a list of the medicines you take. How can you care for yourself at home? To control asthma over the long term  Medicines   Controller medicines reduce swelling in your lungs. They also prevent asthma attacks. Take your controller medicine exactly as prescribed. Talk to your doctor if you have any problems with your medicine. · Inhaled corticosteroid is a common and effective controller medicine. Using it the right way can prevent or reduce most side effects. · Take your controller medicine every day, not just when you have symptoms. This helps prevent problems before they occur. · Always bring your asthma medicine with you when you travel. · Your doctor may prescribe long-acting medicine that combines a corticosteroid with a beta2-agonist. Follow your doctor's instructions exactly about how to take a long-acting medicine. Examples include:  ? Fluticasone and salmeterol (Advair). ? Budesonide and formoterol (Symbicort). · Do not depend on your controller medicines to stop an asthma attack that has already started. They do not work fast enough to help. · Your doctor may also prescribe anticholinergic inhalers. These include ipratropium (Atrovent) and tiotropium (Spiriva). Education   · Learn what sets off an asthma attack. Avoid these triggers when you can. Common triggers include smoke, air pollution, pollen, animal dander, colds, stress, and cold air. · Do not smoke. Smoking can make COPD and asthma worse.  If you need help quitting, talk to your doctor about stop-smoking programs and medicines. These can increase your chances of quitting for good. · Check yourself for symptoms to know which step to follow in your action plan. Watch for things like being short of breath, having chest tightness, coughing, and wheezing. Also notice if symptoms wake you up at night or if you get tired quickly when you exercise. · You may want to learn how to use a peak flow meter. This measures how open your airways are. It may help you know when you will have an asthma attack. To treat attacks when they occur  Use your asthma action plan when you have an attack. Your quick-relief medicine, such as albuterol, will stop an asthma attack. It relaxes the muscles that get tight around the airways. · Take your quick-relief medicine exactly as prescribed. Talk with your doctor if you have any problems with your medicine. · Keep this medicine with you at all times. · You may need to use this medicine before you exercise. If your doctor prescribed corticosteroid pills to use during an attack, take them as directed. They may take hours to work, but they may shorten the attack and help you breathe better. When should you call for help? Call 911 anytime you think you may need emergency care. For example, call if:    · You have severe trouble breathing. Call your doctor now or seek immediate medical care if:    · You have new or worse shortness of breath.     · You are coughing more deeply or more often, especially if you notice more mucus or a change in the color of your mucus.     · You cough up blood.     · You have new or increased swelling in your legs or belly.     · You have a fever.     · You have used your quick-relief medicine but you are still short of breath. Watch closely for changes in your health, and be sure to contact your doctor if you have any problems. Where can you learn more?   Go to http://www.gray.com/  Enter A350 in the search box to learn more about \"COPD and Asthma: Care Instructions. \"  Current as of: February 24, 2020               Content Version: 12.6  © 2006-2020 [employer]. Care instructions adapted under license by [employer] (which disclaims liability or warranty for this information). If you have questions about a medical condition or this instruction, always ask your healthcare professional. [address] any warranty or liability for your use of this information. Patient Education        Shortness of Breath: Care Instructions  Your Care Instructions     Shortness of breath has many causes. Sometimes conditions such as anxiety can lead to shortness of breath. Some people get mild shortness of breath when they exercise. Trouble breathing also can be a symptom of a serious problem, such as asthma, lung disease, emphysema, heart problems, and pneumonia. If your shortness of breath continues, you may need tests and treatment. Watch for any changes in your breathing and other symptoms. Follow-up care is a key part of your treatment and safety. Be sure to make and go to all appointments, and call your doctor if you are having problems. It's also a good idea to know your test results and keep a list of the medicines you take. How can you care for yourself at home? · Do not smoke or allow others to smoke around you. If you need help quitting, talk to your doctor about stop-smoking programs and medicines. These can increase your chances of quitting for good. · Get plenty of rest and sleep. · Take your medicines exactly as prescribed. Call your doctor if you think you are having a problem with your medicine. · Find healthy ways to deal with stress. ? Exercise daily. ? Get plenty of sleep. ? Eat regularly and well. When should you call for help? Call 911 anytime you think you may need emergency care.  For example, call if:    · You have severe shortness of breath.     · You have symptoms of a heart attack. These may include:  ? Chest pain or pressure, or a strange feeling in the chest.  ? Sweating. ? Shortness of breath. ? Nausea or vomiting. ? Pain, pressure, or a strange feeling in the back, neck, jaw, or upper belly or in one or both shoulders or arms. ? Lightheadedness or sudden weakness. ? A fast or irregular heartbeat. After you call 911, the  may tell you to chew 1 adult-strength or 2 to 4 low-dose aspirin. Wait for an ambulance. Do not try to drive yourself. Call your doctor now or seek immediate medical care if:    · Your shortness of breath gets worse or you start to wheeze. Wheezing is a high-pitched sound when you breathe.     · You wake up at night out of breath or have to prop your head up on several pillows to breathe.     · You are short of breath after only light activity or while at rest.   Watch closely for changes in your health, and be sure to contact your doctor if:    · You do not get better over the next 1 to 2 days. Where can you learn more? Go to http://www.gray.com/  Enter S780 in the search box to learn more about \"Shortness of Breath: Care Instructions. \"  Current as of: February 24, 2020               Content Version: 12.6  © 1200-0732 [employer]. Care instructions adapted under license by [employer] (which disclaims liability or warranty for this information). If you have questions about a medical condition or this instruction, always ask your healthcare professional. [place] [address] any warranty or liability for your use of this information.

## 2020-10-11 NOTE — LETTER
NOTIFICATION RETURN TO WORK / SCHOOL 
 
10/11/2020 11:31 AM 
 
Ms. Shante Gonsales [address] [address] To Whom It May Concern: 
 
Shante Gonsales is currently under the care of [address] [place] [address] EMERGENCY DEP. She will return to work/school on:10/19/2020 If there are questions or concerns please have the patient contact our office. Sincerely, Emerald Pérez NP

## 2020-10-11 NOTE — ED PROVIDER NOTES
HPI patient is a 71-year-old female with past medical history significant for asthma, diverticulosis and hypertension who presents to the ED with reports of episodic shortness of breath intermittent wheezing and cough for 1 week. She had an virtual appointment on Thursday, October 8 with Shayan Quintana PA-C and was prescribed a albuterol metered-dose inhaler and short course of prednisone. Denies fever, cold symptoms, headache, neck pain, visual changes, focal weakness or rash. Denies any difficulty breathing, difficulty swallowing or chest pain. Denies any nausea, vomiting or diarrhea. Pt. Reports using her albuterol nebulizer at 1 AM this morning and her albuterol metered-dose inhaler around 7 AM with minimal relief. Patient reports that she has seasonal allergies and at this time of year usually has an acute exacerbation of her asthma. She has not had to be hospitalized for her asthma since 1987.         Past Medical History:   Diagnosis Date    Asthma     Diverticulosis     on colonoscopy 2012    Essential hypertension        Past Surgical History:   Procedure Laterality Date    HX APPENDECTOMY      HX HYSTERECTOMY  1997    secondary to endometriosis    MA COLONOSCOPY FLX DX W/COLLJ SPEC WHEN PFRMD  12/12/2012              Family History:   Problem Relation Age of Onset    Diabetes Mother     Cancer Mother 66        breast cancer    Breast Cancer Mother 68    Cancer Maternal Aunt         2 with breast cancer    Breast Cancer Maternal Aunt     Stroke Maternal Grandmother     Breast Cancer Maternal Aunt        Social History     Socioeconomic History    Marital status:      Spouse name: Not on file    Number of children: Not on file    Years of education: Not on file    Highest education level: Not on file   Occupational History    Not on file   Social Needs    Financial resource strain: Not on file    Food insecurity     Worry: Not on file     Inability: Not on file   [employer] needs     Medical: Not on file     Non-medical: Not on file   Tobacco Use    Smoking status: Former Smoker     Types: Cigarettes     Last attempt to quit: 7/3/1997     Years since quittin.2    Smokeless tobacco: Never Used    Tobacco comment: Quit in 37s. Substance and Sexual Activity    Alcohol use: No     Alcohol/week: 0.0 standard drinks    Drug use: No    Sexual activity: Not on file   Lifestyle    Physical activity     Days per week: Not on file     Minutes per session: Not on file    Stress: Not on file   Relationships    Social connections     Talks on phone: Not on file     Gets together: Not on file     Attends [employer]ology service: Not on file     Active member of club or organization: Not on file     Attends meetings of clubs or organizations: Not on file     Relationship status: Not on file    Intimate partner violence     Fear of current or ex partner: Not on file     Emotionally abused: Not on file     Physically abused: Not on file     Forced sexual activity: Not on file   Other Topics Concern    Not on file   Social History Narrative    Not on file         ALLERGIES: Other medication    Review of Systems   Constitutional: Negative for activity change, appetite change, fever and unexpected weight change. HENT: Negative for congestion, rhinorrhea and sore throat. Eyes: Negative for visual disturbance. Respiratory: Positive for shortness of breath and wheezing. Negative for chest tightness. Cardiovascular: Negative for chest pain, palpitations and leg swelling. Gastrointestinal: Negative for abdominal pain, constipation, diarrhea and vomiting. Genitourinary: Negative for difficulty urinating, dysuria and flank pain. Musculoskeletal: Negative for back pain and neck pain. Skin: Negative for rash. Neurological: Negative for headaches. All other systems reviewed and are negative.       Vitals:    10/11/20 1005   BP: (!) 144/101   Pulse: 69   Resp: 22   Temp: 98.6 °F (37 °C)   SpO2: 97%            Physical Exam  Constitutional:       General: She is not in acute distress. Appearance: She is well-developed. She is not ill-appearing, toxic-appearing or diaphoretic. Comments: Female; non smoker   HENT:      Head: Normocephalic. Mouth/Throat:      Mouth: Mucous membranes are moist.   Neck:      Musculoskeletal: Normal range of motion and neck supple. Cardiovascular:      Rate and Rhythm: Normal rate and regular rhythm. Pulmonary:      Breath sounds: Examination of the right-middle field reveals wheezing. Examination of the left-middle field reveals wheezing. Wheezing present. Chest:      Chest wall: No tenderness. Abdominal:      Tenderness: There is no abdominal tenderness. Musculoskeletal: Normal range of motion. Right lower leg: She exhibits no tenderness. No edema. Left lower leg: She exhibits no tenderness. No edema. Lymphadenopathy:      Cervical: No cervical adenopathy. Skin:     General: Skin is warm and dry. Findings: No rash. Neurological:      General: No focal deficit present. Mental Status: She is alert and oriented to person, place, and time. Psychiatric:         Behavior: Behavior normal.          MDM       Procedures      Xr Chest Pa Lat    Result Date: 10/11/2020  IMPRESSION: Right hilar prominence could reflect enlargement of the pulmonary artery or adenopathy. No infiltrate. Shante Gonsales was evaluated in the Emergency Department on 10/11/2020 for the symptoms described in the history of present illness. He/she was evaluated in the context of the global COVID-19 pandemic, which necessitated consideration that the patient might be at risk for infection with the SARS-CoV-2 virus that causes COVID-19.  Institutional protocols and algorithms that pertain to the evaluation of patients at risk for COVID-19 are in a state of rapid change based on information released by regulatory bodies including the CDC and federal and state organizations. These policies and algorithms were followed during the patient's care in the ED. Surrogate Decision Maker (Who do you want to make decisions for you in the event you are not able to?): Extended Emergency Contact Information  Primary Emergency Contact: Santy Macias Phone: [phone]  Relation: Child  Secondary Emergency Contact: Jeimy Upton, Shanel Weaver Phone: [phone]  Relation: Child    Ventilation (Do you want to be intubated and mechanically ventilated?):  Yes    CPR (Do you want chest compressions and electricity in an attempt to restart your heart?): Yes    Encourage patient to continue with her albuterol treatments as previously prescribed and her prednisone taper as prescribed. Will prescribe Z-Tej and Zyrtec to add to her regimen. Recommend follow-up appointment with pulmonology for further evaluation and treatment. Will provide work note for her to quarantine until culture results are back. Jacki Carbajal NP   Discussed plan of care with Dr. Janice Alonso.    11:29 AM  Patient's results and plan of care have been reviewed with her. Patient and/or family have verbally conveyed their understanding and agreement of the patient's signs, symptoms, diagnosis, treatment and prognosis and additionally agree to follow up as recommended or return to the Emergency Room should her condition change prior to follow-up. Discharge instructions have also been provided to the patient with some educational information regarding her diagnosis as well a list of reasons why she would want to return to the ER prior to her follow-up appointment should her condition change. Jacki Carbajal NP

## 2020-10-12 ENCOUNTER — PATIENT OUTREACH (OUTPATIENT)
Dept: FAMILY MEDICINE CLINIC | Age: 60
End: 2020-10-12

## 2020-10-16 ENCOUNTER — PATIENT OUTREACH (OUTPATIENT)
Dept: FAMILY MEDICINE CLINIC | Age: 60
End: 2020-10-16

## 2020-10-16 NOTE — PROGRESS NOTES
Patient contacted regarding recent discharge and COVID-19 risk. Discussed COVID-19 related testing which was available at this time. Test results were negative. Patient informed of results, if available? yes    Care Transition Nurse/ Ambulatory Care Manager/ LPN Care Coordinator contacted the patient by telephone to perform post discharge assessment. Verified name and  with patient as identifiers. Patient has following risk factors of: asthma. CTN/ACM/LPN reviewed discharge instructions, medical action plan and red flags related to discharge diagnosis. Reviewed and educated them on any new and changed medications related to discharge diagnosis. Advised obtaining a 90-day supply of all daily and as-needed medications. Advance Care Planning:   Does patient have an Advance Directive: currently not on file; education provided     Education provided regarding infection prevention, and signs and symptoms of COVID-19 and when to seek medical attention with patient who verbalized understanding. Discussed exposure protocols and quarantine from [address] you at higher risk for severe illness  and given an opportunity for questions and concerns. The patient agrees to contact the COVID-19 hotline [phone] or PCP office for questions related to their healthcare. CTN/ACM/LPN provided contact information for future reference. From CDC: Are you at higher risk for severe illness?  Wash your hands often.  Avoid close contact (6 feet, which is about two arm lengths) with people who are sick.  Put distance between yourself and other people if COVID-19 is spreading in your community.  Clean and disinfect frequently touched surfaces.  Avoid all cruise travel and non-essential air travel.  Call your healthcare professional if you have concerns about COVID-19 and your underlying condition or if you are sick.     For more information on steps you can take to protect yourself, see CDC's How to Protect Yourself      Patient/family/caregiver given information for GetWell Loop and agrees to enroll no  Patient's preferred e-mail:  n/a  Patient's preferred phone number: n/a  Based on Loop alert triggers, patient will be contacted by nurse care manager for worsening symptoms. Plan for follow-up call in 7-14 days based on severity of symptoms and risk factors.

## 2020-10-22 ENCOUNTER — VIRTUAL VISIT (OUTPATIENT)
Dept: FAMILY MEDICINE CLINIC | Age: 60
End: 2020-10-22
Payer: COMMERCIAL

## 2020-10-22 VITALS — BODY MASS INDEX: 34.74 KG/M2 | WEIGHT: 190 LBS

## 2020-10-22 DIAGNOSIS — R93.89 ABNORMAL CXR: Primary | ICD-10-CM

## 2020-10-22 DIAGNOSIS — J45.20 INTERMITTENT ASTHMA, UNSPECIFIED ASTHMA SEVERITY, UNSPECIFIED WHETHER COMPLICATED: ICD-10-CM

## 2020-10-22 PROCEDURE — 99442 PR PHYS/QHP TELEPHONE EVALUATION 11-20 MIN: CPT | Performed by: PHYSICIAN ASSISTANT

## 2020-10-22 NOTE — PROGRESS NOTES
Attempted to call patient to perform intake; VM left. Will try again. Flower Aleman. Prieto Peace, RN    Coordination of Care  1. Have you been to the ER, urgent care clinic since your last visit? Hospitalized since your last visit? Yes Where: ER- [place]- breathing issues- October 11th    2. Have you seen or consulted any other health care providers outside of the [address] [place] [address] since your last visit? Include any pap smears or colon screening. No    Does the patient need refills? YES    Learning Assessment Complete? no  Depression Screening complete in the past 12 months? yes    Kristen HUA.  Prieto Peace, [address] [facility]

## 2020-10-22 NOTE — PROGRESS NOTES
Assessment/Plan:    Diagnoses and all orders for this visit:    1. Abnormal CXR  -     XR CHEST PA LAT; Future    2. Intermittent asthma, unspecified asthma severity, unspecified whether complicated  -     XR CHEST PA LAT; Future    [facility] annual due in , sent message to nurse pool to contact her  F/up after repeat CXR for recommendations  Needs to be scheduled for flu shot    Follow-up and Dispositions    · Return in about 4 weeks (around 2020) for virtual visit Ambreen carmona, can you make her a flu shot appt? Navneet Perry 35, PAVERONICA Kent expressed understanding of this plan. An AVS was printed and given to the patient.      ----------------------------------------------------------------------    Chief Complaint   Patient presents with    Follow-up     asthma       History of Present Illness:    Pt called and consented to virtual telephone visit, she declined video  She was identified by name and       She is being seen today for short interval f/up asthma. She had NOT started the prednisone 3 days after our virtual appt and was worsening so she went to the ER. While there, she had an abnl cxr and was told to f/up with pulmonology (mentioned possible changes c/w sarcoidosis). She has no hx of sarcoid. We have made a plan to repeat the cxr and go from there as her sxs are improved and no longer does she have cough or SOB. She is using her albuterol occasionally and has not needed the nebulizer at all. She is not having any fevers. Past Medical History:   Diagnosis Date    Asthma     Diverticulosis     on colonoscopy     Essential hypertension        Current Outpatient Medications   Medication Sig Dispense Refill    cetirizine (ZyrTEC) 10 mg tablet Take 1 Tab by mouth daily. 20 Tab 0    albuterol (PROVENTIL HFA, VENTOLIN HFA, PROAIR HFA) 90 mcg/actuation inhaler Take 1 Puff by inhalation every six (6) hours as needed for Wheezing.  1 Inhaler 3    atenoloL (TENORMIN) 50 mg tablet TAKE 1 TABLET BY MOUTH EVERY DAY 90 Tab 1    amLODIPine (NORVASC) 5 mg tablet TAKE 1 TABLET BY MOUTH EVERY DAY 90 Tab 1    cloNIDine HCl (CATAPRES) 0.1 mg tablet 1 at hs 90 Tab 3    fluticasone propion-salmeterol (AIRDUO RESPICLICK) 329-60 mcg/actuation aepb Take 1 Inhalation by inhalation two (2) times a day. Indications: controller medication for asthma 1 Each 11       Allergies   Allergen Reactions    Other Medication Itching     Powder in gloves       Social History     Tobacco Use    Smoking status: Former Smoker     Types: Cigarettes     Last attempt to quit: 7/3/1997     Years since quittin.3    Smokeless tobacco: Never Used    Tobacco comment: Quit in 37s.    Substance Use Topics    Alcohol use: No     Alcohol/week: 0.0 standard drinks    Drug use: No       Family History   Problem Relation Age of Onset    Diabetes Mother     Cancer Mother 66        breast cancer    Breast Cancer Mother 68    Cancer Maternal Aunt         2 with breast cancer    Breast Cancer Maternal Aunt     Stroke Maternal Grandmother     Breast Cancer Maternal Aunt        Physical Exam:     Visit Vitals  Wt 190 lb (86.2 kg)   LMP 10/25/2013   BMI 34.74 kg/m²       A&Ox3  WDWN NAD  Respirations normal and non labored

## 2020-10-23 ENCOUNTER — HOSPITAL ENCOUNTER (OUTPATIENT)
Dept: GENERAL RADIOLOGY | Age: 60
Discharge: HOME | End: 2020-10-23
Payer: COMMERCIAL

## 2020-10-23 DIAGNOSIS — R93.89 ABNORMAL CXR: ICD-10-CM

## 2020-10-23 DIAGNOSIS — J45.20 INTERMITTENT ASTHMA, UNSPECIFIED ASTHMA SEVERITY, UNSPECIFIED WHETHER COMPLICATED: ICD-10-CM

## 2020-10-23 PROCEDURE — 71046 X-RAY EXAM CHEST 2 VIEWS: CPT

## 2020-10-26 ENCOUNTER — PATIENT OUTREACH (OUTPATIENT)
Dept: FAMILY MEDICINE CLINIC | Age: 60
End: 2020-10-26

## 2020-10-26 ENCOUNTER — TELEPHONE (OUTPATIENT)
Dept: FAMILY PLANNING/WOMEN'S HEALTH CLINIC | Age: 60
End: 2020-10-26

## 2020-10-26 NOTE — PROGRESS NOTES
Discharge instructions given to parents. All questions answered. Parents verbalized understanding. Patient resolved from Transition of Care episode on 10/26/20  Discussed COVID-19 related testing which was available at this time. Test results were negative. Patient informed of results, if available? Already reported     Patient/family has been provided the following resources and education related to COVID-19:                         Signs, symptoms and red flags related to COVID-19            CDC exposure and quarantine guidelines            [facility] exposure contact - [phone]            Contact for their local Department of Health                 Patient currently reports that the following symptoms have improved:  no worsening symptoms. No further outreach scheduled with this CTN/ACM/LPN/HC/ MA. Episode of Care resolved. Patient has this CTN/ACM/LPN/HC/MA contact information if future needs arise.

## 2020-10-29 ENCOUNTER — TELEPHONE (OUTPATIENT)
Dept: FAMILY MEDICINE CLINIC | Age: 60
End: 2020-10-29

## 2020-10-29 NOTE — LETTER
10/29/2020 12:23 PM 
 
Ms. Murali Maradiaga [address] [address] Dear Ms Shay Taylor, I am sorry I have been unable to reach you by phone. Here is the message from your provider regarding the recent chest xray you had on 10/22/20. Patient Result Comments Written by SURAJ Espana on 10/27/2020  2:18 PM  
Normal chest x-ray. Ms Shay Taylor, Thank you for going for your chest xray. The results are good, there are no abnormalities. Please follow up as we discussed. Take care and blessings, Lalita Lopez PA-C Sincerely, Shellie Alejandre RN For/  
 
[address] [facility], [place]

## 2020-10-29 NOTE — TELEPHONE ENCOUNTER
The pt returned the call asking for her chest xray results. The pt was told they were normal. The pt also told a letter was mailed to her home yesterday wit hthe MyChart message from the provider in it. The pt asked \" so there was no Sarcoidosis/\" the pt was told no the xray per the provider was normal.  The pt verbalized understanding.  Vipin Nunez RN

## 2020-10-29 NOTE — TELEPHONE ENCOUNTER
Tc to the pt. 2x. No answer. A message was left for her to contact the nurse. She had called and left a message on the CBN phone asking for her recent repeat chest xray results. The pt stated in the message that she does not know how to look at her results in [facility]. The pt was sent a message with the providers message copied onto it from her [employer] message.   Enoch Gibson RN

## 2020-11-24 ENCOUNTER — VIRTUAL VISIT (OUTPATIENT)
Dept: FAMILY MEDICINE CLINIC | Age: 60
End: 2020-11-24

## 2020-11-24 DIAGNOSIS — I10 ESSENTIAL HYPERTENSION: Primary | ICD-10-CM

## 2020-11-24 DIAGNOSIS — J45.40 MODERATE PERSISTENT ASTHMA WITHOUT COMPLICATION: ICD-10-CM

## 2020-11-24 PROCEDURE — 99442 PR PHYS/QHP TELEPHONE EVALUATION 11-20 MIN: CPT | Performed by: PHYSICIAN ASSISTANT

## 2020-11-24 RX ORDER — FLUTICASONE PROPIONATE AND SALMETEROL 113; 14 UG/1; UG/1
1 POWDER, METERED RESPIRATORY (INHALATION) 2 TIMES DAILY
Qty: 1 EACH | Refills: 11 | Status: SHIPPED | OUTPATIENT
Start: 2020-11-24 | End: 2021-07-14 | Stop reason: SDUPTHER

## 2020-11-24 NOTE — PROGRESS NOTES
RN called pt and confirmed name and . RN advised pt  that a Good RX coupon ($56) for her Airduo respiclick inhaler will be texted to her for her [facility] pharmacy (confirmed location). Pt received the text during the phone call. Pt expecting a call from registrar regarding next appts for flu shot, labs and follow up appt and had no further questions.   Diogo Ortega RN

## 2020-11-24 NOTE — PROGRESS NOTES
Coordination of Care  1. Have you been to the ER, urgent care clinic since your last visit? Hospitalized since your last visit? No    2. Have you seen or consulted any other health care providers outside of the [address] [place] [address] since your last visit? Include any pap smears or colon screening. No    Does the patient need refills? YES    Learning Assessment Complete?  yes  Depression Screening complete in the past 12 months? yes

## 2020-11-24 NOTE — PROGRESS NOTES
Assessment/Plan:    Diagnoses and all orders for this visit:    1. Essential hypertension  -     LIPID PANEL; Future  -     METABOLIC PANEL, COMPREHENSIVE; Future  -     HEMOGLOBIN A1C WITH EAG; Future  -     TSH 3RD GENERATION; Future    2. Moderate persistent asthma without complication  -     fluticasone propion-salmeteroL (AirDuo RespiClick) 530-18 mcg/actuation aepb; Take 1 Inhalation by inhalation two (2) times a day. Indications: controller medication for asthma    The lab orders are under \"main\", I couldn't get them to open in another live clinic set. They will need to be put in again when she is there. You can reach out to me when she arrives    Follow-up and Dispositions    · Return in about 1 week (around 2020) for needs appt next week for flu shot and fasting labs, then appt F2F 2 months . FARIDA Miranda expressed understanding of this plan. An AVS was printed and given to the patient.      ----------------------------------------------------------------------    Chief Complaint   Patient presents with    Asthma     f/u       History of Present Illness:  Pt called and consented to virtual telephone visit, she declined video  She was identified by name and     She is being seen today for short interval asthma recheck  She is using albuterol very infrequently, like once a week typically only when going out into the cold  She has no SOB,JIMENEZ, extremity swelling or chest pain  She is using her meds as rx'd  She does not check her bp at home  She was advised to PLEASE avoid crowds, use mask and other modalities to stay healthy with her hx of asthma  I had asked her to get a flu shot last month but she didn't, will schedule for next week. This is imperative this year!   Needs labs and face to face visit to check bp       Past Medical History:   Diagnosis Date    Asthma     Diverticulosis     on colonoscopy     Essential hypertension        Current Outpatient Medications   Medication Sig Dispense Refill    fluticasone propion-salmeteroL (AirDuo RespiClick) 708-17 mcg/actuation aepb Take 1 Inhalation by inhalation two (2) times a day. Indications: controller medication for asthma 1 Each 11    cetirizine (ZyrTEC) 10 mg tablet Take 1 Tab by mouth daily. 20 Tab 0    albuterol (PROVENTIL HFA, VENTOLIN HFA, PROAIR HFA) 90 mcg/actuation inhaler Take 1 Puff by inhalation every six (6) hours as needed for Wheezing. 1 Inhaler 3    atenoloL (TENORMIN) 50 mg tablet TAKE 1 TABLET BY MOUTH EVERY DAY 90 Tab 1    amLODIPine (NORVASC) 5 mg tablet TAKE 1 TABLET BY MOUTH EVERY DAY 90 Tab 1    cloNIDine HCl (CATAPRES) 0.1 mg tablet 1 at hs 90 Tab 3       Allergies   Allergen Reactions    Other Medication Itching     Powder in gloves       Social History     Tobacco Use    Smoking status: Former Smoker     Types: Cigarettes     Last attempt to quit: 7/3/1997     Years since quittin.4    Smokeless tobacco: Never Used    Tobacco comment: Quit in 37s.    Substance Use Topics    Alcohol use: No     Alcohol/week: 0.0 standard drinks    Drug use: No       Family History   Problem Relation Age of Onset    Diabetes Mother     Cancer Mother 66        breast cancer    Breast Cancer Mother 68    Cancer Maternal Aunt         2 with breast cancer    Breast Cancer Maternal Aunt     Stroke Maternal Grandmother     Breast Cancer Maternal Aunt        Physical Exam:     Visit Vitals  LMP 10/25/2013       A&Ox3  WDWN NAD  Respirations normal and non labored

## 2020-12-08 ENCOUNTER — IMMUNIZATION CLINIC (OUTPATIENT)
Dept: FAMILY MEDICINE CLINIC | Age: 60
End: 2020-12-08

## 2020-12-08 ENCOUNTER — LAB ONLY (OUTPATIENT)
Dept: FAMILY MEDICINE CLINIC | Age: 60
End: 2020-12-08

## 2020-12-08 ENCOUNTER — HOSPITAL ENCOUNTER (OUTPATIENT)
Dept: LAB | Age: 60
Discharge: HOME | End: 2020-12-08

## 2020-12-08 VITALS — TEMPERATURE: 96.8 F

## 2020-12-08 DIAGNOSIS — Z23 ENCOUNTER FOR IMMUNIZATION: Primary | ICD-10-CM

## 2020-12-08 DIAGNOSIS — I10 ESSENTIAL HYPERTENSION: ICD-10-CM

## 2020-12-08 LAB
ALBUMIN SERPL-MCNC: 3.6 G/DL (ref 3.5–5)
ALBUMIN/GLOB SERPL: 0.9 {RATIO} (ref 1.1–2.2)
ALP SERPL-CCNC: 94 U/L (ref 45–117)
ALT SERPL-CCNC: 28 U/L (ref 12–78)
ANION GAP SERPL CALC-SCNC: 5 MMOL/L (ref 5–15)
AST SERPL-CCNC: 19 U/L (ref 15–37)
BILIRUB SERPL-MCNC: 0.3 MG/DL (ref 0.2–1)
BUN SERPL-MCNC: 14 MG/DL (ref 6–20)
BUN/CREAT SERPL: 21 (ref 12–20)
CALCIUM SERPL-MCNC: 8.8 MG/DL (ref 8.5–10.1)
CHLORIDE SERPL-SCNC: 109 MMOL/L (ref 97–108)
CHOLEST SERPL-MCNC: 213 MG/DL
CO2 SERPL-SCNC: 26 MMOL/L (ref 21–32)
CREAT SERPL-MCNC: 0.66 MG/DL (ref 0.55–1.02)
EST. AVERAGE GLUCOSE BLD GHB EST-MCNC: 120 MG/DL
GLOBULIN SER CALC-MCNC: 3.8 G/DL (ref 2–4)
GLUCOSE SERPL-MCNC: 99 MG/DL (ref 65–100)
HBA1C MFR BLD: 5.8 % (ref 4–5.6)
HDLC SERPL-MCNC: 49 MG/DL
HDLC SERPL: 4.3 {RATIO} (ref 0–5)
LDLC SERPL CALC-MCNC: 135.6 MG/DL (ref 0–100)
LIPID PROFILE,FLP: ABNORMAL
POTASSIUM SERPL-SCNC: 4.2 MMOL/L (ref 3.5–5.1)
PROT SERPL-MCNC: 7.4 G/DL (ref 6.4–8.2)
SODIUM SERPL-SCNC: 140 MMOL/L (ref 136–145)
TRIGL SERPL-MCNC: 142 MG/DL (ref ?–150)
TSH SERPL DL<=0.05 MIU/L-ACNC: 1.13 UIU/ML (ref 0.36–3.74)
VLDLC SERPL CALC-MCNC: 28.4 MG/DL

## 2020-12-08 PROCEDURE — 83036 HEMOGLOBIN GLYCOSYLATED A1C: CPT

## 2020-12-08 PROCEDURE — 90471 IMMUNIZATION ADMIN: CPT

## 2020-12-08 PROCEDURE — 80053 COMPREHEN METABOLIC PANEL: CPT

## 2020-12-08 PROCEDURE — 84443 ASSAY THYROID STIM HORMONE: CPT

## 2020-12-08 PROCEDURE — 80061 LIPID PANEL: CPT

## 2020-12-08 PROCEDURE — 90686 IIV4 VACC NO PRSV 0.5 ML IM: CPT

## 2020-12-08 NOTE — PROGRESS NOTES
Patient requested the influenza vaccine. Vaccination consent completed, no contraindications to the vaccine noted. VIS given,  potential side effects and reasons to seek emergency assistance reviewed. Patient verbalized understanding. Influenza  vaccine given following policy and protocol, without complications. Entered in [address]. Patient waited in the clinic for 10-15 minutes, patient did not show signs or symptoms of allergic reaction. Jsohua Avendaño RN     Patient states that she has a bump in her left foot that gradually has been getting bigger, started having discomfort about 3 months ago, but noticed the bump a few weeks ago per patient she is having on and off pain 5/10, patient is observed to walk without problem. No redness, no swelling. Nurse reviewed the schedule for today and schedule is full, same day appt. Process reviewed and encouraged patient to call for an appointment as soon as able. Discussed that if swelling, redness, pan worsens or does not go away, SOB to call 911 immediately. This has been fully explained to the patient, who indicates understanding and agrees with plan. No further questions at this time.  Joshua Avendaño, RN

## 2020-12-08 NOTE — PROGRESS NOTES
Pt was here to lab only  We draw LP, CMP A1C, TSH. \  No complication or questions  By Dr Flory Ramos.

## 2020-12-09 NOTE — PROGRESS NOTES
Cholesterol higher than it has been. Please work on lifestyle changes. Normal thyroid testing. A1c consistent with pre-diabetes which will be helped with lifestyle changes.   Please let us know if you would like to speak with our nutritionist.

## 2020-12-12 ENCOUNTER — HOSPITAL ENCOUNTER (OUTPATIENT)
Dept: MAMMOGRAPHY | Age: 60
Discharge: HOME | End: 2020-12-12
Attending: NURSE PRACTITIONER

## 2020-12-12 ENCOUNTER — OFFICE VISIT (OUTPATIENT)
Dept: FAMILY PLANNING/WOMEN'S HEALTH CLINIC | Age: 60
End: 2020-12-12

## 2020-12-12 DIAGNOSIS — Z12.31 SCREENING MAMMOGRAM, ENCOUNTER FOR: ICD-10-CM

## 2020-12-12 DIAGNOSIS — Z12.31 SCREENING MAMMOGRAM, ENCOUNTER FOR: Primary | ICD-10-CM

## 2020-12-12 PROCEDURE — 77067 SCR MAMMO BI INCL CAD: CPT

## 2020-12-12 NOTE — PROGRESS NOTES
EVERY WOMANS LIFE HISTORY QUESTIONNAIRE       No Yes Comments   Has a doctor ever seen or felt anything wrong with your breast? [x]                                  []                                     Have you ever had a breast biopsy? [x]                                  []                                          When and where was last mammogram performed? 12/12/19 [facility]    Have you ever been told that there was a problem on your mammogram?   No Yes Comments   [x]                                  []                                       Do you have breast implants? No Yes Comments   [x]                                  []                                       When was your last Pap test performed? 2013    Have you ever had an abnormal Pap test?   No Yes Comments   []                                  [x]                                  1997     Have you had a hysterectomy? No Yes Comments (why)   []                                  [x]                                  1997 Endometriosis     Have you been through menopause? No Yes Date of LMP   []                                  [x]                                  1997     Did your mother take JATIN? No Yes Unknown   [x]                                  []                                       Do you have a history of HIV exposure? No Yes    [x]                                  []                                       Have you ever been diagnosed with any type of Cancer   No Yes Comments (type,when,where,type of treatment   []                                  [x]                                          Has a family member been diagnosed with breast or ovarian cancer?    No Yes Comments (which family members, and type   [x]                                  []                                       Are you taking hormone replacement therapy (HRT)     No Yes Comments   [x]                                  []                                       How many times have you been pregnant? 4      Number of live births ? 2    Are you experiencing any of the following? No Yes Comments   Nipple Discharge [x]                                  []                                     Breast Lump/Masses [x]                                  []                                     Breast Skin Changes [x]                                  []                                          No Yes Comments   Vaginal Discharge [x]                                  []                                     Abnormal/unusual vaginal bleeding [x]                                  []                                         Are you experiencing any other health problems? Age at first period?  17    Age at first birth? 24    Ht--5'    Wt--200

## 2020-12-12 NOTE — PROGRESS NOTES
HISTORY OF PRESENT ILLNESS  Kyara Isaac is a 62 y.o. female here for [facility]. HPI Ms. Vandana Koch, denies abnormal SBE's, performs monthly. She denies abnormal vaginal discharge, bloating and pelvic pain. H/O partial hysterectomy in 1997 for endometriosis. Denies use of hormones. She is followed at [facility] for primary care. Review of Systems   Constitutional: Negative. Respiratory: Negative. Cardiovascular: Negative. Gastrointestinal: Negative. Genitourinary: Negative. Physical Exam  Nursing note reviewed. Constitutional:       Appearance: Normal appearance. Chest:      Breasts:         Right: Normal. No swelling, bleeding, inverted nipple, mass, nipple discharge, skin change or tenderness. Left: Normal. No swelling, bleeding, inverted nipple, mass, nipple discharge, skin change or tenderness. Genitourinary:     Labia:         Right: No rash, tenderness or lesion. Left: No rash, tenderness or lesion. Urethra: No urethral pain or urethral swelling. Vagina: Normal.      Adnexa:         Right: No fullness. Left: No fullness. Rectum: Normal. Guaiac result negative. Comments: Vaginal cuff intact. Lymphadenopathy:      Upper Body:      Right upper body: No supraclavicular, axillary or pectoral adenopathy. Left upper body: No supraclavicular, axillary or pectoral adenopathy. Lower Body: No right inguinal adenopathy. No left inguinal adenopathy. Skin:     General: Skin is warm and dry. Neurological:      Mental Status: She is alert and oriented to person, place, and time. Psychiatric:         Mood and Affect: Mood normal.         Behavior: Behavior normal.         ASSESSMENT and PLAN  1. [facility]  2. CBE benign  3. Screening mammogram today  4.  H/O partial hysterectomy, 1997      -endometriosis

## 2020-12-14 NOTE — PROGRESS NOTES
Tc to the pt. She was given the providers message regarding her latest lab results. Her Cholesterol is higher and her A1C was still in pre-diabetes range. The pt was given the providers recommendations of lifestyle changes like working on a healthy diet and exercise. The pt was offered an appt with the Nutritionist. The pt stated she has had 2 appt's in the past with the Nutritionist. The pt is declining the offer at this time. The pt stated she has already started this week on working on her diet changes.  Minh Gonsales RN

## 2021-02-11 DIAGNOSIS — Z13.220 SCREENING CHOLESTEROL LEVEL: Primary | ICD-10-CM

## 2021-02-11 DIAGNOSIS — I10 ESSENTIAL HYPERTENSION: ICD-10-CM

## 2021-02-11 DIAGNOSIS — Z13.1 SCREENING FOR DIABETES MELLITUS: ICD-10-CM

## 2021-02-24 ENCOUNTER — OFFICE VISIT (OUTPATIENT)
Dept: FAMILY MEDICINE CLINIC | Age: 61
End: 2021-02-24

## 2021-02-24 VITALS
HEIGHT: 61 IN | TEMPERATURE: 97.8 F | BODY MASS INDEX: 36.82 KG/M2 | DIASTOLIC BLOOD PRESSURE: 57 MMHG | OXYGEN SATURATION: 96 % | SYSTOLIC BLOOD PRESSURE: 119 MMHG | HEART RATE: 60 BPM | WEIGHT: 195 LBS

## 2021-02-24 DIAGNOSIS — I10 ESSENTIAL HYPERTENSION: Primary | ICD-10-CM

## 2021-02-24 DIAGNOSIS — I10 ESSENTIAL HYPERTENSION WITH GOAL BLOOD PRESSURE LESS THAN 140/90: ICD-10-CM

## 2021-02-24 DIAGNOSIS — N95.1 SYMPTOMS, SUCH AS FLUSHING, SLEEPLESSNESS, HEADACHE, LACK OF CONCENTRATION, ASSOCIATED WITH THE MENOPAUSE: ICD-10-CM

## 2021-02-24 PROCEDURE — 99213 OFFICE O/P EST LOW 20 MIN: CPT | Performed by: PHYSICIAN ASSISTANT

## 2021-02-24 RX ORDER — CLONIDINE HYDROCHLORIDE 0.1 MG/1
TABLET ORAL
Qty: 90 TAB | Refills: 3 | Status: SHIPPED | OUTPATIENT
Start: 2021-02-24

## 2021-02-24 RX ORDER — ATENOLOL 50 MG/1
TABLET ORAL
Qty: 90 TAB | Refills: 1 | Status: SHIPPED | OUTPATIENT
Start: 2021-02-24 | End: 2021-09-27

## 2021-02-24 RX ORDER — AMLODIPINE BESYLATE 5 MG/1
TABLET ORAL
Qty: 90 TAB | Refills: 1 | Status: SHIPPED | OUTPATIENT
Start: 2021-02-24 | End: 2021-09-27

## 2021-02-24 NOTE — PROGRESS NOTES
Coordination of Care  1. Have you been to the ER, urgent care clinic since your last visit? Hospitalized since your last visit? No    2. Have you seen or consulted any other health care providers outside of the [address] [place] [address] since your last visit? Include any pap smears or colon screening. No    Does the patient need refills? YES    Learning Assessment Complete?  yes  Depression Screening complete in the past 12 months? yes

## 2021-02-24 NOTE — PROGRESS NOTES
I have reviewed the notes, assessments, and/or procedures performed by Irene Vasquez PA-C, I concur with her documentation of Faizan Rodriguez.

## 2021-02-24 NOTE — PROGRESS NOTES
Avs discussed with Laina Thomason by Discharge Nurse Charlee Zarco LPN. [facility] form completed and faxed. Patient verbalized understanding and has no further questions.  AVS printed and given to patient Charlee Zarco LPN

## 2021-02-24 NOTE — PROGRESS NOTES
Assessment/Plan:    Diagnoses and all orders for this visit:    1. Essential hypertension  -     AMB POC EKG ROUTINE W/ 12 LEADS, INTER & REP    2. Essential hypertension with goal blood pressure less than 140/90  -     amLODIPine (NORVASC) 5 mg tablet; TAKE 1 TABLET BY MOUTH EVERY DAY  -     atenoloL (TENORMIN) 50 mg tablet; TAKE 1 TABLET BY MOUTH EVERY DAY    3. Symptoms, such as flushing, sleeplessness, headache, lack of concentration, associated with the menopause  -     cloNIDine HCL (CATAPRES) 0.1 mg tablet; 1 at hs    Prior EKG from 2019 compared with [facility] EKG from 2/21 and there are no changes  Probable BBB, discussed with my supervising physician and we both agreed that weight loss medication would potentially be dangerous  This was discussed with the pt and she understands  Offer consult with cardiology and she declines  Avoid caffeine, stimulants  F/up for fasting labs and recheck       OF note: our EKG machine in not operational today. Will need to do this again at f/up    Follow-up and Dispositions    · Return in about 3 months (around 5/24/2021) for fasting labs then 1-2 weeks later appt. can be virtual or f2f. FARIDA Dent expressed understanding of this plan. An AVS was printed and given to the patient.      ----------------------------------------------------------------------    Chief Complaint   Patient presents with    Hypertension     f/u    Cholesterol Problem     f/u       History of Present Illness:  Pt here for review of \"abnormal EKG\" from [facility], where she recently joined. She has lost 7 lbs to date. She joined in order to avoid ever becoming diabetic like her mom.  She has no sxs of dizziness with position changes, she has no SOB, no chest pain  She is taking her 3 bp meds as rx'd  She had a consult with cardiology for chest pain about 10 years ago, records in her chart including a stress test  She has an ekg from 2019 which shows same findings of flipped t waves lead I and II  Will scan in ekg from [facility]    She is using air duo as rx'd and only occassionally needs the rescue inhaler, not even daily        Past Medical History:   Diagnosis Date    Asthma     Diverticulosis     on colonoscopy     Essential hypertension        Current Outpatient Medications   Medication Sig Dispense Refill    amLODIPine (NORVASC) 5 mg tablet TAKE 1 TABLET BY MOUTH EVERY DAY 90 Tab 1    atenoloL (TENORMIN) 50 mg tablet TAKE 1 TABLET BY MOUTH EVERY DAY 90 Tab 1    cloNIDine HCL (CATAPRES) 0.1 mg tablet 1 at hs 90 Tab 3    fluticasone propion-salmeteroL (AirDuo RespiClick) 592-34 mcg/actuation aepb Take 1 Inhalation by inhalation two (2) times a day. Indications: controller medication for asthma 1 Each 11    cetirizine (ZyrTEC) 10 mg tablet Take 1 Tab by mouth daily. 20 Tab 0    albuterol (PROVENTIL HFA, VENTOLIN HFA, PROAIR HFA) 90 mcg/actuation inhaler Take 1 Puff by inhalation every six (6) hours as needed for Wheezing. 1 Inhaler 3       Allergies   Allergen Reactions    Other Medication Itching     Powder in gloves       Social History     Tobacco Use    Smoking status: Former Smoker     Types: Cigarettes     Quit date: 7/3/1997     Years since quittin.6    Smokeless tobacco: Never Used    Tobacco comment: Quit in 37s.    Substance Use Topics    Alcohol use: No     Alcohol/week: 0.0 standard drinks    Drug use: No       Family History   Problem Relation Age of Onset    Diabetes Mother     Cancer Mother 66        breast cancer    Breast Cancer Mother 68    Cancer Maternal Aunt         2 with breast cancer    Breast Cancer Maternal Aunt     Stroke Maternal Grandmother     Breast Cancer Maternal Aunt        Physical Exam:     Visit Vitals  BP (!) 119/57 (BP 1 Location: Right arm, BP Patient Position: Sitting, BP Cuff Size: Adult long)   Pulse 60   Temp 97.8 °F (36.6 °C) (Temporal)   Ht 5' 1.5\" (1.562 m)   Wt 195 lb (88.5 kg)   LMP 10/25/2013   SpO2 96%   BMI 36.25 kg/m²       A&Ox3  WDWN NAD  Respirations normal and non labored  Looks well, pleasant  Lab Results   Component Value Date/Time    Sodium 140 12/08/2020 09:00 AM    Potassium 4.2 12/08/2020 09:00 AM    Chloride 109 (H) 12/08/2020 09:00 AM    CO2 26 12/08/2020 09:00 AM    Anion gap 5 12/08/2020 09:00 AM    Glucose 99 12/08/2020 09:00 AM    BUN 14 12/08/2020 09:00 AM    Creatinine 0.66 12/08/2020 09:00 AM    BUN/Creatinine ratio 21 (H) 12/08/2020 09:00 AM    GFR est AA >60 12/08/2020 09:00 AM    GFR est non-AA >60 12/08/2020 09:00 AM    Calcium 8.8 12/08/2020 09:00 AM    Bilirubin, total 0.3 12/08/2020 09:00 AM    Alk.  phosphatase 94 12/08/2020 09:00 AM    Protein, total 7.4 12/08/2020 09:00 AM    Albumin 3.6 12/08/2020 09:00 AM    Globulin 3.8 12/08/2020 09:00 AM    A-G Ratio 0.9 (L) 12/08/2020 09:00 AM    ALT (SGPT) 28 12/08/2020 09:00 AM    AST (SGOT) 19 12/08/2020 09:00 AM     Lab Results   Component Value Date/Time    Hemoglobin A1c 5.8 (H) 12/08/2020 09:00 AM     Lab Results   Component Value Date/Time    Cholesterol, total 213 (H) 12/08/2020 09:00 AM    HDL Cholesterol 49 12/08/2020 09:00 AM    LDL, calculated 135.6 (H) 12/08/2020 09:00 AM    VLDL, calculated 28.4 12/08/2020 09:00 AM    Triglyceride 142 12/08/2020 09:00 AM    CHOL/HDL Ratio 4.3 12/08/2020 09:00 AM

## 2021-07-01 ENCOUNTER — LAB ONLY (OUTPATIENT)
Dept: FAMILY MEDICINE CLINIC | Age: 61
End: 2021-07-01

## 2021-07-01 ENCOUNTER — HOSPITAL ENCOUNTER (OUTPATIENT)
Dept: LAB | Age: 61
Discharge: HOME | End: 2021-07-01

## 2021-07-01 DIAGNOSIS — Z13.220 SCREENING CHOLESTEROL LEVEL: ICD-10-CM

## 2021-07-01 DIAGNOSIS — I10 ESSENTIAL HYPERTENSION: ICD-10-CM

## 2021-07-01 DIAGNOSIS — Z13.1 SCREENING FOR DIABETES MELLITUS: ICD-10-CM

## 2021-07-01 PROCEDURE — 83036 HEMOGLOBIN GLYCOSYLATED A1C: CPT

## 2021-07-01 PROCEDURE — 80061 LIPID PANEL: CPT

## 2021-07-01 PROCEDURE — 80053 COMPREHEN METABOLIC PANEL: CPT

## 2021-07-01 PROCEDURE — 84443 ASSAY THYROID STIM HORMONE: CPT

## 2021-07-01 NOTE — PROGRESS NOTES
Patient was her for her labs.  Labs were drawn with no complications as per [address] [place] [address] PA

## 2021-07-02 LAB
ALBUMIN SERPL-MCNC: 3.5 G/DL (ref 3.5–5)
ALBUMIN/GLOB SERPL: 1 {RATIO} (ref 1.1–2.2)
ALP SERPL-CCNC: 94 U/L (ref 45–117)
ALT SERPL-CCNC: 26 U/L (ref 12–78)
ANION GAP SERPL CALC-SCNC: 4 MMOL/L (ref 5–15)
AST SERPL-CCNC: 15 U/L (ref 15–37)
BILIRUB SERPL-MCNC: 0.3 MG/DL (ref 0.2–1)
BUN SERPL-MCNC: 14 MG/DL (ref 6–20)
BUN/CREAT SERPL: 27 (ref 12–20)
CALCIUM SERPL-MCNC: 9 MG/DL (ref 8.5–10.1)
CHLORIDE SERPL-SCNC: 113 MMOL/L (ref 97–108)
CHOLEST SERPL-MCNC: 199 MG/DL
CO2 SERPL-SCNC: 25 MMOL/L (ref 21–32)
COMMENT, HOLDF: NORMAL
CREAT SERPL-MCNC: 0.51 MG/DL (ref 0.55–1.02)
EST. AVERAGE GLUCOSE BLD GHB EST-MCNC: 123 MG/DL
GLOBULIN SER CALC-MCNC: 3.6 G/DL (ref 2–4)
GLUCOSE SERPL-MCNC: 101 MG/DL (ref 65–100)
HBA1C MFR BLD: 5.9 % (ref 4–5.6)
HDLC SERPL-MCNC: 44 MG/DL
HDLC SERPL: 4.5 {RATIO} (ref 0–5)
LDLC SERPL CALC-MCNC: 129.6 MG/DL (ref 0–100)
POTASSIUM SERPL-SCNC: 3.9 MMOL/L (ref 3.5–5.1)
PROT SERPL-MCNC: 7.1 G/DL (ref 6.4–8.2)
SAMPLES BEING HELD,HOLD: NORMAL
SODIUM SERPL-SCNC: 142 MMOL/L (ref 136–145)
TRIGL SERPL-MCNC: 127 MG/DL (ref ?–150)
TSH SERPL DL<=0.05 MIU/L-ACNC: 1.01 UIU/ML (ref 0.36–3.74)
VLDLC SERPL CALC-MCNC: 25.4 MG/DL

## 2021-07-02 NOTE — PROGRESS NOTES
Thyroid stable, labs are stable. F/up is scheduled for 7/14/21.  Will send her a note in [place]

## 2021-07-08 ENCOUNTER — TELEPHONE (OUTPATIENT)
Dept: FAMILY MEDICINE CLINIC | Age: 61
End: 2021-07-08

## 2021-07-08 NOTE — TELEPHONE ENCOUNTER
Tc to the pt returning her call. The pt was informed she was not contacted with her lab results due to the provider had had sent her a [facility] message with the results. This nurse asked the pt if she would like the message to be read to her. She stated yes. The providers entire message was read to the pt and she confirms she has an appt 07/14/21 with the provider.  Lydia Bates RN

## 2021-07-14 ENCOUNTER — HOSPITAL ENCOUNTER (OUTPATIENT)
Dept: LAB | Age: 61
Discharge: HOME | End: 2021-07-14

## 2021-07-14 ENCOUNTER — OFFICE VISIT (OUTPATIENT)
Dept: FAMILY MEDICINE CLINIC | Age: 61
End: 2021-07-14

## 2021-07-14 VITALS
HEART RATE: 65 BPM | OXYGEN SATURATION: 93 % | HEIGHT: 62 IN | SYSTOLIC BLOOD PRESSURE: 131 MMHG | WEIGHT: 191 LBS | TEMPERATURE: 98 F | BODY MASS INDEX: 35.15 KG/M2 | DIASTOLIC BLOOD PRESSURE: 64 MMHG

## 2021-07-14 DIAGNOSIS — I10 ESSENTIAL HYPERTENSION: ICD-10-CM

## 2021-07-14 DIAGNOSIS — Z87.448 HISTORY OF HEMATURIA: Primary | ICD-10-CM

## 2021-07-14 DIAGNOSIS — Z87.448 HISTORY OF HEMATURIA: ICD-10-CM

## 2021-07-14 DIAGNOSIS — J45.40 MODERATE PERSISTENT ASTHMA WITHOUT COMPLICATION: ICD-10-CM

## 2021-07-14 PROCEDURE — 99213 OFFICE O/P EST LOW 20 MIN: CPT | Performed by: PHYSICIAN ASSISTANT

## 2021-07-14 PROCEDURE — 82043 UR ALBUMIN QUANTITATIVE: CPT

## 2021-07-14 PROCEDURE — 81003 URINALYSIS AUTO W/O SCOPE: CPT

## 2021-07-14 RX ORDER — FLUTICASONE PROPIONATE AND SALMETEROL 113; 14 UG/1; UG/1
1 POWDER, METERED RESPIRATORY (INHALATION) 2 TIMES DAILY
Qty: 1 EACH | Refills: 11 | Status: SHIPPED | OUTPATIENT
Start: 2021-07-14

## 2021-07-14 RX ORDER — ALBUTEROL SULFATE 90 UG/1
1 AEROSOL, METERED RESPIRATORY (INHALATION)
Qty: 1 INHALER | Refills: 3 | Status: SHIPPED | OUTPATIENT
Start: 2021-07-14

## 2021-07-14 NOTE — PROGRESS NOTES
Coordination of Care  1. Have you been to the ER, urgent care clinic since your last visit? Hospitalized since your last visit? No    2. Have you seen or consulted any other health care providers outside of the [address] [place] [address] since your last visit? Include any pap smears or colon screening. No    Does the patient need refills? yes    Learning Assessment Complete?  yes  Depression Screening complete in the past 12 months? yes

## 2021-07-14 NOTE — PROGRESS NOTES
Assessment/Plan:    Diagnoses and all orders for this visit:    1. History of hematuria  -     URINALYSIS W/ RFLX MICROSCOPIC; Future    2. Essential hypertension  -     MICROALBUMIN, UR, RAND W/ MICROALB/CREAT RATIO; Future    3. Moderate persistent asthma without complication  -     fluticasone propion-salmeteroL (AirDuo RespiClick) 635-31 mcg/actuation aepb; Take 1 Inhalation by inhalation two (2) times a day. Indications: controller medication for asthma  -     albuterol (PROVENTIL HFA, VENTOLIN HFA, PROAIR HFA) 90 mcg/actuation inhaler; Take 1 Puff by inhalation every six (6) hours as needed for Wheezing. Follow-up and Dispositions    · Return in about 6 months (around 1/14/2022). FARIDA Cates expressed understanding of this plan. An AVS was printed and given to the patient.      ----------------------------------------------------------------------    Chief Complaint   Patient presents with    Hypertension     f/u       History of Present Illness:  Pt presents for scheduled f/up 6 months. She is doing well and her grieving process after the death of her mom is progressing normally  She has days when she cries but also days when she laughs. She is fixing up her moms house and clearing out her clothes for donation  She continues to work, she has had her COVID vaccines  She has been taking her meds as rx'd for her HTN. She denies chest pain, SOB, JIMENEZ, extremity swelling  She takes her asthma meds less frequently but has not had any cough, SOB or wheezing  She requests refills of meds  She does not do \"mychart\" so she did not get my message. She requests a letter or phone call for results  Annual well woman set for this fall, she is UTD on this    She and her mom both were told that they had \"blood in their urine\" before. Her mom had complications due to DM and kidney problems  She is not experiencing any SOB, fluid in legs or difficulty urinating.  Will check urine microscopic studies     Past Medical History:   Diagnosis Date    Asthma     Diverticulosis     on colonoscopy 2012    Essential hypertension        Current Outpatient Medications   Medication Sig Dispense Refill    fluticasone propion-salmeteroL (AirDuo RespiClick) 873-65 mcg/actuation aepb Take 1 Inhalation by inhalation two (2) times a day. Indications: controller medication for asthma 1 Each 11    albuterol (PROVENTIL HFA, VENTOLIN HFA, PROAIR HFA) 90 mcg/actuation inhaler Take 1 Puff by inhalation every six (6) hours as needed for Wheezing. 1 Inhaler 3    amLODIPine (NORVASC) 5 mg tablet TAKE 1 TABLET BY MOUTH EVERY DAY 90 Tab 1    atenoloL (TENORMIN) 50 mg tablet TAKE 1 TABLET BY MOUTH EVERY DAY 90 Tab 1    cloNIDine HCL (CATAPRES) 0.1 mg tablet 1 at hs 90 Tab 3    cetirizine (ZyrTEC) 10 mg tablet Take 1 Tab by mouth daily. 20 Tab 0       Allergies   Allergen Reactions    Other Medication Itching     Powder in gloves       Social History     Tobacco Use    Smoking status: Former Smoker     Types: Cigarettes     Quit date: 7/3/1997     Years since quittin.0    Smokeless tobacco: Never Used    Tobacco comment: Quit in 37s.    Substance Use Topics    Alcohol use: No     Alcohol/week: 0.0 standard drinks    Drug use: No       Family History   Problem Relation Age of Onset    Diabetes Mother     Cancer Mother 66        breast cancer    Breast Cancer Mother 68    Cancer Maternal Aunt         2 with breast cancer    Breast Cancer Maternal Aunt     Stroke Maternal Grandmother     Breast Cancer Maternal Aunt        Physical Exam:     Visit Vitals  /64 (BP 1 Location: Left upper arm, BP Patient Position: Sitting)   Pulse 65   Temp 98 °F (36.7 °C) (Temporal)   Ht 5' 2.05\" (1.576 m)   Wt 191 lb (86.6 kg)   LMP 10/25/2013   SpO2 93%   BMI 34.88 kg/m²       A&Ox3  WDWN NAD  Respirations normal and non labored  Lungs CTA itzel  Ext no CCE  Cor RRR   Lab Results   Component Value Date/Time Cholesterol, total 199 07/01/2021 08:35 AM    HDL Cholesterol 44 07/01/2021 08:35 AM    LDL, calculated 129.6 (H) 07/01/2021 08:35 AM    VLDL, calculated 25.4 07/01/2021 08:35 AM    Triglyceride 127 07/01/2021 08:35 AM    CHOL/HDL Ratio 4.5 07/01/2021 08:35 AM

## 2021-07-15 LAB
AMORPH CRY URNS QL MICRO: ABNORMAL
APPEARANCE UR: ABNORMAL
BACTERIA URNS QL MICRO: ABNORMAL /HPF
BILIRUB UR QL: NEGATIVE
COLOR UR: ABNORMAL
CREAT UR-MCNC: 153 MG/DL
EPITH CASTS URNS QL MICRO: ABNORMAL /LPF
GLUCOSE UR STRIP.AUTO-MCNC: NEGATIVE MG/DL
HGB UR QL STRIP: NEGATIVE
KETONES UR QL STRIP.AUTO: NEGATIVE MG/DL
LEUKOCYTE ESTERASE UR QL STRIP.AUTO: NEGATIVE
MICROALBUMIN UR-MCNC: 7.47 MG/DL
MICROALBUMIN/CREAT UR-RTO: 49 MG/G (ref 0–30)
NITRITE UR QL STRIP.AUTO: NEGATIVE
PH UR STRIP: 5 [PH] (ref 5–8)
PROT UR STRIP-MCNC: NEGATIVE MG/DL
RBC #/AREA URNS HPF: ABNORMAL /HPF (ref 0–5)
SP GR UR REFRACTOMETRY: 1.02 (ref 1–1.03)
UROBILINOGEN UR QL STRIP.AUTO: 0.2 EU/DL (ref 0.2–1)
WBC URNS QL MICRO: ABNORMAL /HPF (ref 0–4)

## 2021-07-15 NOTE — PROGRESS NOTES
Left message on her phone (she asked me to call and not use my chart because she can't get it to work ever) with the following message: Your urine shows no blood. There are some bacteria so if you develop pain, frequency, burning with urination then please let me know. Yesterday, you told me you did not have these symptoms so this is likely a contaminant.  Take care and God Bless

## 2021-09-27 DIAGNOSIS — I10 ESSENTIAL HYPERTENSION WITH GOAL BLOOD PRESSURE LESS THAN 140/90: ICD-10-CM

## 2021-09-27 RX ORDER — ATENOLOL 50 MG/1
TABLET ORAL
Qty: 90 TABLET | Refills: 1 | Status: SHIPPED | OUTPATIENT
Start: 2021-09-27

## 2021-09-27 RX ORDER — AMLODIPINE BESYLATE 5 MG/1
TABLET ORAL
Qty: 90 TABLET | Refills: 1 | Status: SHIPPED | OUTPATIENT
Start: 2021-09-27

## 2022-01-18 ENCOUNTER — IMMUNIZATION CLINIC (OUTPATIENT)
Dept: FAMILY MEDICINE CLINIC | Age: 62
End: 2022-01-18

## 2022-01-18 DIAGNOSIS — Z23 NEEDS FLU SHOT: Primary | ICD-10-CM

## 2022-01-18 DIAGNOSIS — Z23 ENCOUNTER FOR IMMUNIZATION: ICD-10-CM

## 2022-01-18 PROCEDURE — 90471 IMMUNIZATION ADMIN: CPT

## 2022-01-18 PROCEDURE — 90686 IIV4 VACC NO PRSV 0.5 ML IM: CPT

## 2022-01-18 NOTE — PROGRESS NOTES
Requests flu vaccine; denies fever, egg allergy not past allergic reactions to the flu vaccine. [facility] V[facility] information sheet given, explained possible S/E. Reviewed sx indicating need to be seen in ER. Pt had no adverse reaction at time of discharge.   Cristal Gaona RN

## 2022-01-26 ENCOUNTER — NURSE TRIAGE (OUTPATIENT)
Dept: OTHER | Facility: CLINIC | Age: 62
End: 2022-01-26

## 2022-01-26 NOTE — TELEPHONE ENCOUNTER
Received call from [address] at [facility] with Red Flag Complaint. Subjective: Caller states \"dizziness starting at night\"     Current Symptoms: Dizziness that seems to start at night or in the morning, even when she is laying down. Denies lightheadedness, feels like she is spinning. Mild now, she is sitting on a bed. Not off balanced, no vision issues. When she felt felt it 3 days ago, she felt \"something\" in her chest, not a pain, maybe felt like her heart was beating too slow but doesn't know for sure what the feeling was. Didn't happen again. Unable to find pulse with writer. No new meds or med changes. Denies n/v. Nothing makes it worse. Onset: 3 days ago; sudden    Associated Symptoms: NA    Pain Severity: none; none; none    Temperature: no feverby unknown method    What has been tried: nothing    LMP: NA Pregnant: NA    Recommended disposition: be seen today    Care advice provided, patient verbalizes understanding; denies any other questions or concerns; instructed to call back for any new or worsening symptoms. Writer provided warm transfer to Josselin Roach at [facility] for appointment scheduling    Attention Provider: Thank you for allowing me to participate in the care of your patient. The patient was connected to triage in response to information provided to the [facility]. Please do not respond through this encounter as the response is not directed to a shared pool.     Reason for Disposition   Patient wants to be seen    Protocols used: [id number]

## 2022-03-18 PROBLEM — E66.01 SEVERE OBESITY (HCC): Status: ACTIVE | Noted: 2019-06-26

## 2022-09-17 DIAGNOSIS — J45.40 MODERATE PERSISTENT ASTHMA WITHOUT COMPLICATION: ICD-10-CM

## 2022-09-19 RX ORDER — FLUTICASONE PROPIONATE AND SALMETEROL 113; 14 UG/1; UG/1
POWDER, METERED RESPIRATORY (INHALATION)
Qty: 1 EACH | Refills: 11 | OUTPATIENT
Start: 2022-09-19

## 2022-10-12 DIAGNOSIS — J45.40 MODERATE PERSISTENT ASTHMA WITHOUT COMPLICATION: ICD-10-CM

## 2022-10-14 RX ORDER — FLUTICASONE PROPIONATE AND SALMETEROL 113; 14 UG/1; UG/1
POWDER, METERED RESPIRATORY (INHALATION)
Qty: 1 EACH | Refills: 11 | OUTPATIENT
Start: 2022-10-14